# Patient Record
Sex: MALE | Race: WHITE | Employment: OTHER | URBAN - METROPOLITAN AREA
[De-identification: names, ages, dates, MRNs, and addresses within clinical notes are randomized per-mention and may not be internally consistent; named-entity substitution may affect disease eponyms.]

---

## 2023-12-05 RX ORDER — ATORVASTATIN CALCIUM 80 MG/1
80 TABLET, FILM COATED ORAL NIGHTLY
COMMUNITY
Start: 2017-09-25

## 2023-12-05 RX ORDER — ROPINIROLE 0.5 MG/1
0.5 TABLET, FILM COATED ORAL
COMMUNITY
Start: 2019-06-12 | End: 2023-12-05 | Stop reason: ALTCHOICE

## 2023-12-05 RX ORDER — GABAPENTIN 300 MG/1
300 CAPSULE ORAL DAILY
COMMUNITY
Start: 2019-11-20

## 2023-12-05 RX ORDER — ALPRAZOLAM 0.5 MG/1
TABLET ORAL
COMMUNITY
Start: 2019-11-12 | End: 2023-12-05 | Stop reason: ALTCHOICE

## 2023-12-05 RX ORDER — TAMSULOSIN HYDROCHLORIDE 0.4 MG/1
0.4 CAPSULE ORAL DAILY
COMMUNITY
Start: 2020-01-12 | End: 2023-12-05 | Stop reason: ALTCHOICE

## 2023-12-05 RX ORDER — AMLODIPINE BESYLATE 5 MG/1
5 TABLET ORAL DAILY
COMMUNITY

## 2023-12-05 RX ORDER — ASPIRIN 81 MG/1
81 TABLET ORAL DAILY
COMMUNITY
Start: 2018-10-15

## 2023-12-05 RX ORDER — NITROGLYCERIN 0.4 MG/1
0.4 TABLET SUBLINGUAL EVERY 5 MIN PRN
COMMUNITY
Start: 2019-11-20

## 2023-12-05 RX ORDER — GABAPENTIN 300 MG/1
900 CAPSULE ORAL NIGHTLY
COMMUNITY

## 2023-12-05 RX ORDER — CELECOXIB 100 MG/1
100 CAPSULE ORAL DAILY
COMMUNITY
End: 2023-12-05 | Stop reason: ALTCHOICE

## 2023-12-05 NOTE — OR NURSING
Dr. Marline Tapia office notified patient did stop eliquis but has not stopped baby aspirin for surgery.

## 2023-12-05 NOTE — DISCHARGE INSTRUCTIONS
Ureteroscopy with stent placement   Discharge Instructions: Take prescriptions as directed, ensuring you take entire course of antibiotic. No driving while taking narcotic pain medication. Wean off narcotics as soon as able. OK to shower after discharge. May resume regular diet. No heavy lifting >10lbs day of procedure, avoid strenuous activity, may walk. You may see blood in the urine after the procedure and entire time stent is in place. Please stay hydrated. You may experience flank pain, and/or frequency/urgency of urination while the stent is in place. Please use Flomax (Tamsulosin) to help with these symptoms. Please call attending physician or hospital  with questions. Please call or present to ED for fever >101 F, intractable nausea and vomiting, or uncontrolled pain. Follow up with Dr. Genet Rangel in 2 weeks. Call office to confirm appointment.

## 2023-12-05 NOTE — H&P
Pre-op History and Physical      Patient:  Tamika Jaime  MRN: [id number]  YOB: 1950    HISTORY OF PRESENT ILLNESS:     The patient is a 68 y.o. male who presents with right upper tract abnormality. Here for cystoscopy, bilateral retrograde pyelogram, possible right ureteral biopsy, possible right stent placement. Patient's old records, notes and chart reviewed and summarized above. Past Medical History:    Past Medical History:   Diagnosis Date    Arthritis     back , can not stand up straight    At risk for falling     pt states legs \" unstable \" , \" just give out \"    CAD (coronary artery disease)     Chronic pain     back    Constipation     COPD (chronic obstructive pulmonary disease) ([address])     COVID-19 vaccine series completed     per wife, asked to bring card day of surgery to get documented    Diverticulosis 10/2023    on CT scan    Edentulous     GERD (gastroesophageal reflux disease)     diet controlled    Hematuria 2023    intermittently    Hyperlipidemia     Hypertension     Kidney stones 10/2023    on CT scan    Mobility impaired     uses cane or walker    New onset a-fib ([address]) 10/20/2023    NSTEMI (non-ST elevated myocardial infarction) ([address]) 07/25/2017    Pneumonia 10/20/2023    admitted to [facility] til 10/23/2023 : able to see note s and testing [facility] [place]    Poor historian     Pulmonary nodule     ?  left middle lobe, supposed to have repeat CT scan in 1 month    Right renal mass 10/2023    SOB (shortness of breath)     on exertion    Swelling 10/2023    Bilat lower exts ;pt states now resolved    Tremor     left arm worse then right    Under care of team     Pulmonology at [facility] , last seen 11/13/2023    Under care of team     Neurologists, Dr. Kaye Kendrick in [place] , manages pain : pt states has been seen since hospitalization    Under care of team     Urology, Dr. Shannan Morales, last seen 10/30/2023    Under care of team     Cardiology at

## 2023-12-06 ENCOUNTER — ANESTHESIA EVENT (OUTPATIENT)
Dept: OPERATING ROOM | Age: 73
End: 2023-12-06
Payer: MEDICARE

## 2023-12-06 ENCOUNTER — HOSPITAL ENCOUNTER (OUTPATIENT)
Age: 73
Setting detail: OUTPATIENT SURGERY
Discharge: HOME | End: 2023-12-06
Attending: UROLOGY | Admitting: UROLOGY
Payer: MEDICARE

## 2023-12-06 ENCOUNTER — ANESTHESIA (OUTPATIENT)
Dept: OPERATING ROOM | Age: 73
End: 2023-12-06
Payer: MEDICARE

## 2023-12-06 ENCOUNTER — APPOINTMENT (OUTPATIENT)
Dept: GENERAL RADIOLOGY | Age: 73
End: 2023-12-06
Attending: UROLOGY
Payer: MEDICARE

## 2023-12-06 VITALS
DIASTOLIC BLOOD PRESSURE: 79 MMHG | TEMPERATURE: 97.3 F | WEIGHT: 190 LBS | HEART RATE: 64 BPM | RESPIRATION RATE: 18 BRPM | SYSTOLIC BLOOD PRESSURE: 140 MMHG | OXYGEN SATURATION: 98 % | HEIGHT: 72 IN | BODY MASS INDEX: 25.73 KG/M2

## 2023-12-06 DIAGNOSIS — R31.0 GROSS HEMATURIA: ICD-10-CM

## 2023-12-06 DIAGNOSIS — G89.18 POST-OP PAIN: Primary | ICD-10-CM

## 2023-12-06 LAB
BUN BLD-MCNC: 15 MG/DL (ref 8–26)
EGFR, POC: >60 ML/MIN/1.73M2
GLUCOSE BLD-MCNC: 84 MG/DL (ref 74–100)
POC CREATININE: 0.8 MG/DL (ref 0.51–1.19)

## 2023-12-06 PROCEDURE — 93005 ELECTROCARDIOGRAM TRACING: CPT | Performed by: ANESTHESIOLOGY

## 2023-12-06 PROCEDURE — 7100000011 HC PHASE II RECOVERY - ADDTL 15 MIN: Performed by: UROLOGY

## 2023-12-06 PROCEDURE — C1769 GUIDE WIRE: HCPCS | Performed by: UROLOGY

## 2023-12-06 PROCEDURE — 82565 ASSAY OF CREATININE: CPT

## 2023-12-06 PROCEDURE — 6360000002 HC RX W HCPCS: Performed by: NURSE ANESTHETIST, CERTIFIED REGISTERED

## 2023-12-06 PROCEDURE — 3700000001 HC ADD 15 MINUTES (ANESTHESIA): Performed by: UROLOGY

## 2023-12-06 PROCEDURE — 6360000002 HC RX W HCPCS: Performed by: PHYSICIAN ASSISTANT

## 2023-12-06 PROCEDURE — 2709999900 HC NON-CHARGEABLE SUPPLY: Performed by: UROLOGY

## 2023-12-06 PROCEDURE — 7100000000 HC PACU RECOVERY - FIRST 15 MIN: Performed by: UROLOGY

## 2023-12-06 PROCEDURE — 3700000000 HC ANESTHESIA ATTENDED CARE: Performed by: UROLOGY

## 2023-12-06 PROCEDURE — C2617 STENT, NON-COR, TEM W/O DEL: HCPCS | Performed by: UROLOGY

## 2023-12-06 PROCEDURE — C1758 CATHETER, URETERAL: HCPCS | Performed by: UROLOGY

## 2023-12-06 PROCEDURE — 84520 ASSAY OF UREA NITROGEN: CPT

## 2023-12-06 PROCEDURE — 2720000010 HC SURG SUPPLY STERILE: Performed by: UROLOGY

## 2023-12-06 PROCEDURE — 7100000001 HC PACU RECOVERY - ADDTL 15 MIN: Performed by: UROLOGY

## 2023-12-06 PROCEDURE — 88305 TISSUE EXAM BY PATHOLOGIST: CPT

## 2023-12-06 PROCEDURE — 2500000003 HC RX 250 WO HCPCS: Performed by: NURSE ANESTHETIST, CERTIFIED REGISTERED

## 2023-12-06 PROCEDURE — 82947 ASSAY GLUCOSE BLOOD QUANT: CPT

## 2023-12-06 PROCEDURE — 3600000003 HC SURGERY LEVEL 3 BASE: Performed by: UROLOGY

## 2023-12-06 PROCEDURE — 2580000003 HC RX 258: Performed by: ANESTHESIOLOGY

## 2023-12-06 PROCEDURE — 3600000013 HC SURGERY LEVEL 3 ADDTL 15MIN: Performed by: UROLOGY

## 2023-12-06 PROCEDURE — C1747 HC ENDOSCOPE, SINGLE, URINARY TRACT: HCPCS | Performed by: UROLOGY

## 2023-12-06 PROCEDURE — 2580000003 HC RX 258: Performed by: UROLOGY

## 2023-12-06 PROCEDURE — 7100000010 HC PHASE II RECOVERY - FIRST 15 MIN: Performed by: UROLOGY

## 2023-12-06 PROCEDURE — 6360000004 HC RX CONTRAST MEDICATION: Performed by: UROLOGY

## 2023-12-06 DEVICE — URETERAL STENT
Type: IMPLANTABLE DEVICE | Site: URETER | Status: FUNCTIONAL
Brand: PERCUFLEX™ PLUS

## 2023-12-06 RX ORDER — FENTANYL CITRATE 50 UG/ML
INJECTION, SOLUTION INTRAMUSCULAR; INTRAVENOUS PRN
Status: DISCONTINUED | OUTPATIENT
Start: 2023-12-06 | End: 2023-12-06 | Stop reason: SDUPTHER

## 2023-12-06 RX ORDER — HYOSCYAMINE SULFATE 0.12 MG/1
1 TABLET SUBLINGUAL EVERY 8 HOURS PRN
Qty: 15 EACH | Refills: 0 | Status: SHIPPED | OUTPATIENT
Start: 2023-12-06 | End: 2023-12-11

## 2023-12-06 RX ORDER — DEXAMETHASONE SODIUM PHOSPHATE 10 MG/ML
INJECTION INTRAMUSCULAR; INTRAVENOUS PRN
Status: DISCONTINUED | OUTPATIENT
Start: 2023-12-06 | End: 2023-12-06 | Stop reason: SDUPTHER

## 2023-12-06 RX ORDER — HYDRALAZINE HYDROCHLORIDE 20 MG/ML
10 INJECTION INTRAMUSCULAR; INTRAVENOUS
Status: DISCONTINUED | OUTPATIENT
Start: 2023-12-06 | End: 2023-12-06 | Stop reason: HOSPADM

## 2023-12-06 RX ORDER — DIPHENHYDRAMINE HYDROCHLORIDE 50 MG/ML
12.5 INJECTION INTRAMUSCULAR; INTRAVENOUS
Status: DISCONTINUED | OUTPATIENT
Start: 2023-12-06 | End: 2023-12-06 | Stop reason: HOSPADM

## 2023-12-06 RX ORDER — PROPOFOL 10 MG/ML
INJECTION, EMULSION INTRAVENOUS PRN
Status: DISCONTINUED | OUTPATIENT
Start: 2023-12-06 | End: 2023-12-06 | Stop reason: SDUPTHER

## 2023-12-06 RX ORDER — MEPERIDINE HYDROCHLORIDE 50 MG/ML
12.5 INJECTION INTRAMUSCULAR; INTRAVENOUS; SUBCUTANEOUS EVERY 5 MIN PRN
Status: DISCONTINUED | OUTPATIENT
Start: 2023-12-06 | End: 2023-12-06 | Stop reason: HOSPADM

## 2023-12-06 RX ORDER — ONDANSETRON 2 MG/ML
INJECTION INTRAMUSCULAR; INTRAVENOUS PRN
Status: DISCONTINUED | OUTPATIENT
Start: 2023-12-06 | End: 2023-12-06 | Stop reason: SDUPTHER

## 2023-12-06 RX ORDER — SODIUM CHLORIDE, SODIUM LACTATE, POTASSIUM CHLORIDE, CALCIUM CHLORIDE 600; 310; 30; 20 MG/100ML; MG/100ML; MG/100ML; MG/100ML
INJECTION, SOLUTION INTRAVENOUS CONTINUOUS
Status: DISCONTINUED | OUTPATIENT
Start: 2023-12-06 | End: 2023-12-06 | Stop reason: HOSPADM

## 2023-12-06 RX ORDER — TRAMADOL HYDROCHLORIDE 50 MG/1
50 TABLET ORAL EVERY 6 HOURS PRN
Qty: 12 TABLET | Refills: 0 | Status: SHIPPED | OUTPATIENT
Start: 2023-12-06 | End: 2023-12-09

## 2023-12-06 RX ORDER — TAMSULOSIN HYDROCHLORIDE 0.4 MG/1
0.4 CAPSULE ORAL DAILY
Qty: 15 CAPSULE | Refills: 0 | Status: SHIPPED | OUTPATIENT
Start: 2023-12-06 | End: 2023-12-21

## 2023-12-06 RX ORDER — CEFADROXIL 500 MG/1
500 CAPSULE ORAL 2 TIMES DAILY
Qty: 6 CAPSULE | Refills: 0 | Status: SHIPPED | OUTPATIENT
Start: 2023-12-06 | End: 2023-12-09

## 2023-12-06 RX ORDER — DROPERIDOL 2.5 MG/ML
0.62 INJECTION, SOLUTION INTRAMUSCULAR; INTRAVENOUS
Status: DISCONTINUED | OUTPATIENT
Start: 2023-12-06 | End: 2023-12-06 | Stop reason: HOSPADM

## 2023-12-06 RX ORDER — LIDOCAINE HYDROCHLORIDE 10 MG/ML
INJECTION, SOLUTION EPIDURAL; INFILTRATION; INTRACAUDAL; PERINEURAL PRN
Status: DISCONTINUED | OUTPATIENT
Start: 2023-12-06 | End: 2023-12-06 | Stop reason: SDUPTHER

## 2023-12-06 RX ORDER — PHENAZOPYRIDINE HYDROCHLORIDE 100 MG/1
100 TABLET, FILM COATED ORAL 3 TIMES DAILY PRN
Qty: 15 TABLET | Refills: 0 | Status: SHIPPED | OUTPATIENT
Start: 2023-12-06 | End: 2023-12-11

## 2023-12-06 RX ORDER — SODIUM CHLORIDE 9 MG/ML
INJECTION, SOLUTION INTRAVENOUS PRN
Status: DISCONTINUED | OUTPATIENT
Start: 2023-12-06 | End: 2023-12-06 | Stop reason: HOSPADM

## 2023-12-06 RX ORDER — SODIUM CHLORIDE 0.9 % (FLUSH) 0.9 %
5-40 SYRINGE (ML) INJECTION EVERY 12 HOURS SCHEDULED
Status: DISCONTINUED | OUTPATIENT
Start: 2023-12-06 | End: 2023-12-06 | Stop reason: HOSPADM

## 2023-12-06 RX ORDER — MAGNESIUM HYDROXIDE 1200 MG/15ML
LIQUID ORAL PRN
Status: DISCONTINUED | OUTPATIENT
Start: 2023-12-06 | End: 2023-12-06 | Stop reason: HOSPADM

## 2023-12-06 RX ORDER — METOCLOPRAMIDE HYDROCHLORIDE 5 MG/ML
10 INJECTION INTRAMUSCULAR; INTRAVENOUS
Status: DISCONTINUED | OUTPATIENT
Start: 2023-12-06 | End: 2023-12-06 | Stop reason: HOSPADM

## 2023-12-06 RX ORDER — SODIUM CHLORIDE 0.9 % (FLUSH) 0.9 %
5-40 SYRINGE (ML) INJECTION PRN
Status: DISCONTINUED | OUTPATIENT
Start: 2023-12-06 | End: 2023-12-06 | Stop reason: HOSPADM

## 2023-12-06 RX ADMIN — SODIUM CHLORIDE, POTASSIUM CHLORIDE, SODIUM LACTATE AND CALCIUM CHLORIDE: 600; 310; 30; 20 INJECTION, SOLUTION INTRAVENOUS at 09:46

## 2023-12-06 RX ADMIN — ONDANSETRON 4 MG: 2 INJECTION INTRAMUSCULAR; INTRAVENOUS at 12:20

## 2023-12-06 RX ADMIN — PROPOFOL 200 MG: 10 INJECTION, EMULSION INTRAVENOUS at 12:06

## 2023-12-06 RX ADMIN — FENTANYL CITRATE 25 MCG: 50 INJECTION, SOLUTION INTRAMUSCULAR; INTRAVENOUS at 12:51

## 2023-12-06 RX ADMIN — DEXAMETHASONE SODIUM PHOSPHATE 5 MG: 10 INJECTION INTRAMUSCULAR; INTRAVENOUS at 12:15

## 2023-12-06 RX ADMIN — FENTANYL CITRATE 25 MCG: 50 INJECTION, SOLUTION INTRAMUSCULAR; INTRAVENOUS at 12:40

## 2023-12-06 RX ADMIN — FENTANYL CITRATE 25 MCG: 50 INJECTION, SOLUTION INTRAMUSCULAR; INTRAVENOUS at 12:20

## 2023-12-06 RX ADMIN — LIDOCAINE HYDROCHLORIDE 50 MG: 10 INJECTION, SOLUTION EPIDURAL; INFILTRATION; INTRACAUDAL; PERINEURAL at 12:06

## 2023-12-06 RX ADMIN — Medication 2000 MG: at 12:19

## 2023-12-06 RX ADMIN — FENTANYL CITRATE 25 MCG: 50 INJECTION, SOLUTION INTRAMUSCULAR; INTRAVENOUS at 12:38

## 2023-12-06 ASSESSMENT — ENCOUNTER SYMPTOMS: SHORTNESS OF BREATH: 1

## 2023-12-06 ASSESSMENT — PAIN SCALES - WONG BAKER: WONGBAKER_NUMERICALRESPONSE: 0

## 2023-12-06 ASSESSMENT — PAIN - FUNCTIONAL ASSESSMENT: PAIN_FUNCTIONAL_ASSESSMENT: 0-10

## 2023-12-06 NOTE — OP NOTE
Operative Note      Patient: Radha Ryan  YOB: 1950  MRN: [id number]    Date of Procedure: 12/6/2023    Pre-Op Diagnosis Codes:     * Gross hematuria [R31.0]    Post-Op Diagnosis:  Renal pelvis mass       Procedure(s):  CYSTOSCOPY, BILATERAL RETROGRADE PYELOGRAMS, BILATERAL URETEROSCOPY, RIGHT URETERAL BIOPSY, BILATERAL STENT PLACEMENT    Surgeon(s):  Darrel Abrams MD    Assistant:   Resident: Orville Zhao MD; Nusrat Mittal MD; Samy Jung MD    Anesthesia: General    Estimated Blood Loss (mL): less than 967     Complications: None    Specimens:   ID Type Source Tests Collected by Time Destination   A : RIGHT URETER BIOPSY Tissue Ureter SURGICAL PATHOLOGY Darrel Abrams MD 12/6/2023 1234        Implants:  Implant Name Type Inv. Item Serial No.  Lot No. LRB No. Used Action   STENT URET 4.8FR L28CM PERCFLX + HYDR+ FIRM DUROMETER DB - [id number]  STENT URET 4.8FR L28CM PERCFLX + HYDR+ FIRM DUROMETER DBL  [employer]- [id number] Right 1 Implanted   STENT URET 4.8FR L28CM PERCFLX + HYDR+ FIRM DUROMETER DBL - [id number]  STENT URET 4.8FR L28CM PERCFLX + HYDR+ FIRM DUROMETER DBL  [employer]- [id number] Left 1 Implanted         Drains:   Urinary Catheter 12/06/23 2 Way (Active)   Catheter Indications Perioperative use for selected surgical procedures 12/06/23 1315   Site Assessment Pink; No urethral drainage 12/06/23 1315   Urine Color Cherry 12/06/23 1315   Urine Appearance Clear 12/06/23 1315   Collection Container Standard 12/06/23 1315   Securement Method Leg strap 12/06/23 1315   Catheter Best Practices  Catheter secured to thigh; Bag below bladder;Bag not on floor;Drainage bag less than half full 12/06/23 1315   Status Draining 12/06/23 1315   Output (mL) 100 mL 12/06/23 1315       Findings: On ureteroscopy, renal pelvis on the right-hand side filled with necrotic mass.   Large filling defect of almost the entire renal pelvis on the right-hand side retrograde side however the ureter was generally very narrow in caliber and we were unable to get up to the renal pelvis even after switching one of the Glidewire's up to a stiff wire. At this time we withdrew the ureteroscope and placed another 4.8 x 28 double-J ureteral stent with good distal and proximal curl. This concluded the procedure. The patient had a bit of hematuria so a Byrd catheter was placed. This cleared up in the PACU and was removed. Attending physician was present throughout the procedure. Plan:  Further/formal plan dependent on final pathology. However it is likely that the patient has high-grade papillary urothelial carcinoma at least on the right-hand side. Will need bilateral stent removals in the future as well as perhaps traversing up the left-hand side via ureteroscopy after passive dilation is complete.     Electronically signed by Bruce Villagran MD on 12/6/2023 at 3:02 PM

## 2023-12-06 NOTE — ANESTHESIA PRE PROCEDURE
12/06/23  0943   POCGLU 84   POCBUN 15       Coags: No results found for: \"PROTIME\", \"INR\", \"APTT\"    HCG (If Applicable): No results found for: \"PREGTESTUR\", \"PREGSERUM\", \"HCG\", \"HCGQUANT\"     ABGs: No results found for: \"PHART\", \"PO2ART\", \"YIL9LDV\", \"NUK7FBW\", \"BEART\", \"Z1GTDKLL\"     Type & Screen (If Applicable):  No results found for: \"LABABO\", \"LABRH\"    Drug/Infectious Status (If Applicable):  No results found for: \"HIV\", \"HEPCAB\"    COVID-19 Screening (If Applicable): No results found for: \"COVID19\"           EKG 12/6/2023  Atrial fibrillation with slow ventricular response  Abnormal ECG  No previous ECGs available              Anesthesia Evaluation    Airway: Mallampati: III  TM distance: >3 FB   Neck ROM: limited  Mouth opening: > = 3 FB   Dental:    (+) other and edentulous      Pulmonary:   (+) pneumonia:  COPD:  shortness of breath:     decreased breath sounds                               Cardiovascular:    (+) hypertension:, past MI:, CAD:, CABG/stent:, dysrhythmias: atrial fibrillation      ECG reviewed                        Neuro/Psych:               GI/Hepatic/Renal:             Endo/Other:                     Abdominal: normal exam            Vascular: Other Findings:             Anesthesia Plan      general     ASA 3     (LMA ok)  Induction: intravenous. MIPS: Postoperative opioids intended. Anesthetic plan and risks discussed with patient and child/children. Plan discussed with CRNA.                     Pau Coats MD   12/6/2023

## 2023-12-07 LAB
EKG ATRIAL RATE: 76 BPM
EKG Q-T INTERVAL: 428 MS
EKG QRS DURATION: 90 MS
EKG QTC CALCULATION (BAZETT): 353 MS
EKG R AXIS: 84 DEGREES
EKG T AXIS: 25 DEGREES
EKG VENTRICULAR RATE: 41 BPM

## 2023-12-07 PROCEDURE — 93010 ELECTROCARDIOGRAM REPORT: CPT | Performed by: INTERNAL MEDICINE

## 2023-12-08 LAB — SURGICAL PATHOLOGY REPORT: NORMAL

## 2023-12-12 ENCOUNTER — ANESTHESIA EVENT (OUTPATIENT)
Dept: OPERATING ROOM | Age: 73
End: 2023-12-12
Payer: MEDICARE

## 2023-12-12 ENCOUNTER — ANESTHESIA (OUTPATIENT)
Dept: OPERATING ROOM | Age: 73
End: 2023-12-12
Payer: MEDICARE

## 2023-12-12 ENCOUNTER — APPOINTMENT (OUTPATIENT)
Dept: GENERAL RADIOLOGY | Age: 73
End: 2023-12-12
Attending: UROLOGY
Payer: MEDICARE

## 2023-12-12 ENCOUNTER — HOSPITAL ENCOUNTER (OUTPATIENT)
Age: 73
Setting detail: OUTPATIENT SURGERY
Discharge: HOME | End: 2023-12-12
Attending: UROLOGY | Admitting: UROLOGY
Payer: MEDICARE

## 2023-12-12 VITALS
OXYGEN SATURATION: 96 % | TEMPERATURE: 96.8 F | DIASTOLIC BLOOD PRESSURE: 75 MMHG | WEIGHT: 190 LBS | HEART RATE: 61 BPM | HEIGHT: 72 IN | RESPIRATION RATE: 17 BRPM | BODY MASS INDEX: 25.73 KG/M2 | SYSTOLIC BLOOD PRESSURE: 115 MMHG

## 2023-12-12 DIAGNOSIS — G89.18 ACUTE POST-OPERATIVE PAIN: Primary | ICD-10-CM

## 2023-12-12 PROCEDURE — 3600000012 HC SURGERY LEVEL 2 ADDTL 15MIN: Performed by: UROLOGY

## 2023-12-12 PROCEDURE — 2580000003 HC RX 258: Performed by: UROLOGY

## 2023-12-12 PROCEDURE — C1758 CATHETER, URETERAL: HCPCS | Performed by: UROLOGY

## 2023-12-12 PROCEDURE — 2709999900 HC NON-CHARGEABLE SUPPLY: Performed by: UROLOGY

## 2023-12-12 PROCEDURE — C1747 HC ENDOSCOPE, SINGLE, URINARY TRACT: HCPCS | Performed by: UROLOGY

## 2023-12-12 PROCEDURE — 7100000010 HC PHASE II RECOVERY - FIRST 15 MIN: Performed by: UROLOGY

## 2023-12-12 PROCEDURE — 2500000003 HC RX 250 WO HCPCS: Performed by: NURSE ANESTHETIST, CERTIFIED REGISTERED

## 2023-12-12 PROCEDURE — 6360000002 HC RX W HCPCS: Performed by: NURSE ANESTHETIST, CERTIFIED REGISTERED

## 2023-12-12 PROCEDURE — 3700000001 HC ADD 15 MINUTES (ANESTHESIA): Performed by: UROLOGY

## 2023-12-12 PROCEDURE — 2580000003 HC RX 258: Performed by: NURSE ANESTHETIST, CERTIFIED REGISTERED

## 2023-12-12 PROCEDURE — 3600000002 HC SURGERY LEVEL 2 BASE: Performed by: UROLOGY

## 2023-12-12 PROCEDURE — C1769 GUIDE WIRE: HCPCS | Performed by: UROLOGY

## 2023-12-12 PROCEDURE — 3700000000 HC ANESTHESIA ATTENDED CARE: Performed by: UROLOGY

## 2023-12-12 PROCEDURE — 6360000002 HC RX W HCPCS: Performed by: PHYSICIAN ASSISTANT

## 2023-12-12 PROCEDURE — 7100000000 HC PACU RECOVERY - FIRST 15 MIN: Performed by: UROLOGY

## 2023-12-12 PROCEDURE — 7100000001 HC PACU RECOVERY - ADDTL 15 MIN: Performed by: UROLOGY

## 2023-12-12 PROCEDURE — C2617 STENT, NON-COR, TEM W/O DEL: HCPCS | Performed by: UROLOGY

## 2023-12-12 DEVICE — URETERAL STENT
Type: IMPLANTABLE DEVICE | Status: FUNCTIONAL
Brand: PERCUFLEX™ PLUS

## 2023-12-12 RX ORDER — DEXAMETHASONE SODIUM PHOSPHATE 10 MG/ML
INJECTION INTRAMUSCULAR; INTRAVENOUS PRN
Status: DISCONTINUED | OUTPATIENT
Start: 2023-12-12 | End: 2023-12-12 | Stop reason: SDUPTHER

## 2023-12-12 RX ORDER — FENTANYL CITRATE 50 UG/ML
50 INJECTION, SOLUTION INTRAMUSCULAR; INTRAVENOUS EVERY 5 MIN PRN
Status: DISCONTINUED | OUTPATIENT
Start: 2023-12-12 | End: 2023-12-12 | Stop reason: HOSPADM

## 2023-12-12 RX ORDER — SODIUM CHLORIDE, SODIUM LACTATE, POTASSIUM CHLORIDE, CALCIUM CHLORIDE 600; 310; 30; 20 MG/100ML; MG/100ML; MG/100ML; MG/100ML
INJECTION, SOLUTION INTRAVENOUS CONTINUOUS PRN
Status: DISCONTINUED | OUTPATIENT
Start: 2023-12-12 | End: 2023-12-12 | Stop reason: SDUPTHER

## 2023-12-12 RX ORDER — HYOSCYAMINE SULFATE 0.12 MG/1
1 TABLET SUBLINGUAL EVERY 8 HOURS PRN
Qty: 15 EACH | Refills: 0 | Status: SHIPPED | OUTPATIENT
Start: 2023-12-12 | End: 2023-12-17

## 2023-12-12 RX ORDER — ONDANSETRON 2 MG/ML
INJECTION INTRAMUSCULAR; INTRAVENOUS PRN
Status: DISCONTINUED | OUTPATIENT
Start: 2023-12-12 | End: 2023-12-12 | Stop reason: SDUPTHER

## 2023-12-12 RX ORDER — TAMSULOSIN HYDROCHLORIDE 0.4 MG/1
0.4 CAPSULE ORAL DAILY
Qty: 90 CAPSULE | Refills: 1 | Status: SHIPPED | OUTPATIENT
Start: 2023-12-12

## 2023-12-12 RX ORDER — LIDOCAINE HYDROCHLORIDE 10 MG/ML
INJECTION, SOLUTION EPIDURAL; INFILTRATION; INTRACAUDAL; PERINEURAL PRN
Status: DISCONTINUED | OUTPATIENT
Start: 2023-12-12 | End: 2023-12-12 | Stop reason: SDUPTHER

## 2023-12-12 RX ORDER — SODIUM CHLORIDE 0.9 % (FLUSH) 0.9 %
5-40 SYRINGE (ML) INJECTION PRN
Status: DISCONTINUED | OUTPATIENT
Start: 2023-12-12 | End: 2023-12-12 | Stop reason: HOSPADM

## 2023-12-12 RX ORDER — FENTANYL CITRATE 50 UG/ML
INJECTION, SOLUTION INTRAMUSCULAR; INTRAVENOUS PRN
Status: DISCONTINUED | OUTPATIENT
Start: 2023-12-12 | End: 2023-12-12 | Stop reason: SDUPTHER

## 2023-12-12 RX ORDER — PROPOFOL 10 MG/ML
INJECTION, EMULSION INTRAVENOUS PRN
Status: DISCONTINUED | OUTPATIENT
Start: 2023-12-12 | End: 2023-12-12 | Stop reason: SDUPTHER

## 2023-12-12 RX ORDER — SODIUM CHLORIDE 0.9 % (FLUSH) 0.9 %
5-40 SYRINGE (ML) INJECTION EVERY 12 HOURS SCHEDULED
Status: DISCONTINUED | OUTPATIENT
Start: 2023-12-12 | End: 2023-12-12 | Stop reason: HOSPADM

## 2023-12-12 RX ORDER — OXYCODONE HYDROCHLORIDE 5 MG/1
5 TABLET ORAL EVERY 6 HOURS PRN
Qty: 12 TABLET | Refills: 0 | Status: SHIPPED | OUTPATIENT
Start: 2023-12-12 | End: 2023-12-15

## 2023-12-12 RX ORDER — FENTANYL CITRATE 50 UG/ML
25 INJECTION, SOLUTION INTRAMUSCULAR; INTRAVENOUS EVERY 5 MIN PRN
Status: DISCONTINUED | OUTPATIENT
Start: 2023-12-12 | End: 2023-12-12 | Stop reason: HOSPADM

## 2023-12-12 RX ORDER — MAGNESIUM HYDROXIDE 1200 MG/15ML
LIQUID ORAL PRN
Status: DISCONTINUED | OUTPATIENT
Start: 2023-12-12 | End: 2023-12-12 | Stop reason: HOSPADM

## 2023-12-12 RX ORDER — SODIUM CHLORIDE 9 MG/ML
INJECTION, SOLUTION INTRAVENOUS PRN
Status: DISCONTINUED | OUTPATIENT
Start: 2023-12-12 | End: 2023-12-12 | Stop reason: HOSPADM

## 2023-12-12 RX ADMIN — PHENYLEPHRINE HYDROCHLORIDE 100 MCG: 10 INJECTION INTRAVENOUS at 11:11

## 2023-12-12 RX ADMIN — FENTANYL CITRATE 25 MCG: 50 INJECTION, SOLUTION INTRAMUSCULAR; INTRAVENOUS at 11:37

## 2023-12-12 RX ADMIN — PHENYLEPHRINE HYDROCHLORIDE 100 MCG: 10 INJECTION INTRAVENOUS at 11:22

## 2023-12-12 RX ADMIN — ONDANSETRON 4 MG: 2 INJECTION INTRAMUSCULAR; INTRAVENOUS at 11:39

## 2023-12-12 RX ADMIN — FENTANYL CITRATE 25 MCG: 50 INJECTION, SOLUTION INTRAMUSCULAR; INTRAVENOUS at 11:38

## 2023-12-12 RX ADMIN — Medication 2000 MG: at 11:24

## 2023-12-12 RX ADMIN — SODIUM CHLORIDE, POTASSIUM CHLORIDE, SODIUM LACTATE AND CALCIUM CHLORIDE: 600; 310; 30; 20 INJECTION, SOLUTION INTRAVENOUS at 11:00

## 2023-12-12 RX ADMIN — PROPOFOL 140 MG: 10 INJECTION, EMULSION INTRAVENOUS at 11:09

## 2023-12-12 RX ADMIN — DEXAMETHASONE SODIUM PHOSPHATE 5 MG: 10 INJECTION INTRAMUSCULAR; INTRAVENOUS at 11:30

## 2023-12-12 RX ADMIN — LIDOCAINE HYDROCHLORIDE 50 MG: 10 INJECTION, SOLUTION EPIDURAL; INFILTRATION; INTRACAUDAL; PERINEURAL at 11:09

## 2023-12-12 RX ADMIN — FENTANYL CITRATE 50 MCG: 50 INJECTION, SOLUTION INTRAMUSCULAR; INTRAVENOUS at 11:09

## 2023-12-12 ASSESSMENT — PAIN - FUNCTIONAL ASSESSMENT
PAIN_FUNCTIONAL_ASSESSMENT: 0-10
PAIN_FUNCTIONAL_ASSESSMENT: 0-10

## 2023-12-12 ASSESSMENT — PAIN SCALES - GENERAL: PAINLEVEL_OUTOF10: 0

## 2023-12-12 ASSESSMENT — ENCOUNTER SYMPTOMS: SHORTNESS OF BREATH: 1

## 2023-12-12 ASSESSMENT — PAIN DESCRIPTION - DESCRIPTORS
DESCRIPTORS: NAGGING;DISCOMFORT
DESCRIPTORS: DISCOMFORT

## 2023-12-12 NOTE — OP NOTE
Operative Note      Patient: Ino Morgan  YOB: 1950  MRN: [id number]    Date of Procedure: 12/12/2023    Pre-Op Diagnosis Codes:     * Right renal mass [N28.89]    Post-Op Diagnosis: Same       Procedure(s):  CYSTOSCOPY   RIGHT STENT REMOVAL  LEFT URETEROSCOPY  LEFT STENT EXCHANGE     Surgeon(s):  Suzanne Puckett MD    Assistant:   * No surgical staff found *    Anesthesia: General    Estimated Blood Loss (mL): Minimal    Complications: None    Specimens:   * No specimens in log *    Implants:  Implant Name Type Inv. Item Serial No.  Lot No. LRB No. Used Action   STENT URET 4.8FR L28CM PERCFLX + HYDR+ FIRM DUROMETER DBL - [id number]  STENT URET 4.8FR L28CM PERCFLX + HYDR+ FIRM DUROMETER DBL  [employer]- [id number]  1 Implanted         Drains:   [REMOVED] Urinary Catheter 12/06/23 2 Way (Removed)   Catheter Indications Perioperative use for selected surgical procedures 12/06/23 1315   Site Assessment Pink; No urethral drainage 12/06/23 1315   Urine Color Pink; Cherry 12/06/23 1400   Urine Appearance Clear 12/06/23 1400   Collection Container Standard 12/06/23 1315   Securement Method Leg strap 12/06/23 1315   Catheter Best Practices  Catheter secured to thigh; Bag below bladder;Bag not on floor;Drainage bag less than half full 12/06/23 1315   Status Draining 12/06/23 1315   Output (mL) 400 mL 12/06/23 1400       Findings:   No signs of urothelial carcinoma on left ureteroscopy     INDICATIONS FOR PROCEDURE:  Ino Morgan is a 68 y.o. male with a history of biopsy confirmed high-grade urothelial carcinoma on his right side. Patient is here to confirm no tumor or tumor on his left side since last ureteroscopy they were unable to get up his left side. After the risks, benefits, alternatives, of the procedure were discussed with the patient, informed consent was obtained. The patient elected to proceed.      DETAILS OF THE PROCEDURE:  The patient was brought back from the preoperative

## 2023-12-12 NOTE — DISCHARGE INSTRUCTIONS
Cystoscopy stent placement with string: You may see blood in the urine after the procedure. This should resolve over the next couple days. Please stay hydrated. You may see intermittent blood in the urine while the stent is in place. This is expected. You may experience flank pain, and/or frequency/urgency of urination while the stent is in place. Take medications as prescribed to help with these symptoms. Patient ok to discharge home in good condition    No heavy lifting, >10 lbs for today  Pt should avoid strenuous activity for today  Pt should walk moderately at home  Pt ok to shower   Pt may resume diet as tolerated  Pt should take prescriptions as directed  No driving while on narcotics  Please call attending physician or hospital  with questions  Call or Present to ED if fever (> 101F), intractable nausea vomiting or pain. Pt should follow up with Dr. Cassie Farley, in 4 weeks for next upcoming surgery, please call to confirm appointment    Patient should pull stent in the morning of 12/17/23. There may be some pain associated with the stent removal, which is usually self limiting. We suggest using the pain medication prescribed for you and a nonsteroidal anti-inflammatory such as Ibuprofen, if you are able to take this medication, to control symptoms. Please stay hydrated. Please call with questions.

## 2023-12-12 NOTE — H&P
tablet Take 1 tablet by mouth daily [facility] 10/15/18   Luis Marmolejo MD   atorvastatin (LIPITOR) 80 MG tablet Take 1 tablet by mouth nightly 9/25/17   Luis Marmolejo MD   gabapentin (NEURONTIN) 300 MG capsule Take 1 capsule by mouth daily. am 11/20/19   Luis Marmolejo MD   metoprolol tartrate (LOPRESSOR) 25 MG tablet Take 0.5 tablets by mouth 2 times daily 9/25/17   Luis Marmolejo MD   Multiple Vitamin (MULTI-VITAMIN) TABS Take 1 tablet by mouth daily    Luis Marmolejo MD   nitroGLYCERIN (NITROSTAT) 0.4 MG SL tablet Place 1 tablet under the tongue every 5 minutes as needed for Chest pain 11/20/19   Luis Marmolejo MD   OXYCODONE-ACETAMINOPHEN PO Take 1 tablet by mouth 3 times daily as needed (back pain) 5/325    Luis Marmolejo MD   gabapentin (NEURONTIN) 300 MG capsule Take 3 capsules by mouth at bedtime. Luis Marmolejo MD       Allergies:  Patient has no known allergies.     Social History:    Social History     Socioeconomic History    Marital status:      Spouse name: Not on file    Number of children: Not on file    Years of education: Not on file    Highest education level: Not on file   Occupational History    Not on file   Tobacco Use    Smoking status: Every Day     Packs/day: 2.00     Years: 60.00     Additional pack years: 0.00     Total pack years: 120.00     Types: Cigarettes     Start date: 7/17/1963    Smokeless tobacco: Never   Vaping Use    Vaping Use: Never used   Substance and Sexual Activity    Alcohol use: Not Currently     Comment: none since CAD started in 2017, never had abuse issue    Drug use: Never    Sexual activity: Not on file   Other Topics Concern    Not on file   Social History Narrative    Not on file     Social Determinants of Health     Financial Resource Strain: Not on file   Food Insecurity: Not on file   Transportation Needs: Not on file   Physical Activity: Not on file   Stress: Not on file

## 2023-12-12 NOTE — ANESTHESIA POSTPROCEDURE EVALUATION
Department of Anesthesiology  Postprocedure Note    Patient: Tommy Stratton  MRN: [id number]  YOB: 1950  Date of evaluation: 12/12/2023      Procedure Summary       Date: 12/12/23 Room / Location: [address]    Anesthesia Start: 1104 Anesthesia Stop: 1158    Procedures:       CYSTOSCOPY STENT REMOVAL (Bilateral)      URETEROSCOPY (Left) Diagnosis:       Right renal mass      (Right renal mass [N28.89])    Surgeons: Enolia Buerger, MD Responsible Provider: Galen Rosales MD    Anesthesia Type: General ASA Status: 3            Anesthesia Type: General    Akash Phase I: Akash Score: 10    Akash Phase II: Akash Score: 10      Anesthesia Post Evaluation    Patient location during evaluation: PACU  Patient participation: complete - patient participated  Level of consciousness: awake and alert  Pain score: 1  Airway patency: patent  Nausea & Vomiting: no nausea and no vomiting  Complications: no  Cardiovascular status: hemodynamically stable  Respiratory status: acceptable  Hydration status: euvolemic  Pain management: adequate

## 2024-01-23 RX ORDER — SODIUM CHLORIDE, SODIUM LACTATE, POTASSIUM CHLORIDE, CALCIUM CHLORIDE 600; 310; 30; 20 MG/100ML; MG/100ML; MG/100ML; MG/100ML
INJECTION, SOLUTION INTRAVENOUS CONTINUOUS
OUTPATIENT
Start: 2024-01-23

## 2024-01-23 NOTE — DISCHARGE INSTRUCTIONS
Pre-operative Instructions           NOTHING to eat or drink after midnight the night prior to surgery   (This includes gum, candy, mints, chewing tobacco, etc). Smoking cessation is always advised.   (Follow bowel prep instructions as/if instructed by your surgeon)    Please arrive at the surgery center (Entrance B) by 10:00 AM on 2/7/2024 (or as directed by your surgeon's office).  See Directons to Surgery Center on next page.     Please take only the following medication(s) the day of surgery with a small sip of water: amlodipine (Norvasc), metoprolol (Lopressor), gabapentin (Neurontin), if needed/available: oxycodone (Percocet)    If applicable:   -Use/bring daily inhalers with you   -Do not take diabetic medications on the day of surgery.    Please stop any blood thinning medications  AS DIRECTED BY PRESCRIBING PROVIDER! : Eliquis and Aspirin   Failure to stop these medications as instructed (too soon or too late) may result in injury to you, or your surgery may need to be rescheduled. Below is a list of some examples for your reference.     Antiplatelets : (stop blood cells (called platelets) from sticking together and forming a blood clot):   Aspirin (Bufferin, Ecotrin), Clopidogrel (Plavix), Ticagrelor (Brilinta), Prasugrel (Effient), Dipyridamole/aspirin (Aggrenox), Ticlopidine (Ticlid), Eptifibatide (Integrilin)    Anticoagulants: (slow down your body's process of making clots):   Warfarin (Coumadin), Rivaroxaban (Xarelto), Dabigatran (Pradaxa), Apixaban (Eliquis), Edoxaban (Savaysa), Heparin, Enoxaparin (Lovenox), Fondaparinux (Arixtra)    NSAIDS: Aspirin (Bufferin, Ecotrin), Ibuprofen (Motrin, Nuprin,Advil), Naproxen (Aleve),Meloxicam (Mobic), Celecoxib (Celebrex), Diclofenac (Voltaren), Etodolac (Lodine), Indomethacin, Ketorolac, Nabumetone, Oxaprozin (Daypro), Piroxicam (Feldene), Excedrin (has aspirin in it)    Herbals/supplements: Bromelain, Cinnamon, Cayenne Pepper, Dong quai (female ginseng), Fish

## 2024-01-29 ENCOUNTER — HOSPITAL ENCOUNTER (OUTPATIENT)
Dept: PREADMISSION TESTING | Age: 74
Discharge: HOME | End: 2024-02-02
Payer: MEDICARE

## 2024-01-29 VITALS
HEIGHT: 72 IN | RESPIRATION RATE: 18 BRPM | TEMPERATURE: 98.4 F | WEIGHT: 206 LBS | SYSTOLIC BLOOD PRESSURE: 136 MMHG | OXYGEN SATURATION: 98 % | BODY MASS INDEX: 27.9 KG/M2 | DIASTOLIC BLOOD PRESSURE: 82 MMHG | HEART RATE: 78 BPM

## 2024-01-29 LAB
ABO + RH BLD: NORMAL
ANION GAP SERPL CALCULATED.3IONS-SCNC: 10 MMOL/L (ref 9–17)
ARM BAND NUMBER: NORMAL
BLOOD BANK SAMPLE EXPIRATION: NORMAL
BLOOD GROUP ANTIBODIES SERPL: NEGATIVE
BUN SERPL-MCNC: 13 MG/DL (ref 8–23)
CHLORIDE SERPL-SCNC: 108 MMOL/L (ref 98–107)
CO2 SERPL-SCNC: 24 MMOL/L (ref 20–31)
CREAT SERPL-MCNC: 0.8 MG/DL (ref 0.7–1.2)
ERYTHROCYTE [DISTWIDTH] IN BLOOD BY AUTOMATED COUNT: 20.2 % (ref 11.8–14.4)
GFR SERPL CREATININE-BSD FRML MDRD: >60 ML/MIN/1.73M2
GLUCOSE SERPL-MCNC: 88 MG/DL (ref 70–99)
HCT VFR BLD AUTO: 36.8 % (ref 40.7–50.3)
HGB BLD-MCNC: 11.2 G/DL (ref 13–17)
MCH RBC QN AUTO: 23.4 PG (ref 25.2–33.5)
MCHC RBC AUTO-ENTMCNC: 30.4 G/DL (ref 28.4–34.8)
MCV RBC AUTO: 76.8 FL (ref 82.6–102.9)
NRBC BLD-RTO: 0 PER 100 WBC
PLATELET # BLD AUTO: 314 K/UL (ref 138–453)
PMV BLD AUTO: 10.7 FL (ref 8.1–13.5)
POTASSIUM SERPL-SCNC: 4 MMOL/L (ref 3.7–5.3)
RBC # BLD AUTO: 4.79 M/UL (ref 4.21–5.77)
SODIUM SERPL-SCNC: 142 MMOL/L (ref 135–144)
WBC OTHER # BLD: 10.9 K/UL (ref 3.5–11.3)

## 2024-01-29 PROCEDURE — 86850 RBC ANTIBODY SCREEN: CPT

## 2024-01-29 PROCEDURE — 36415 COLL VENOUS BLD VENIPUNCTURE: CPT

## 2024-01-29 PROCEDURE — 85027 COMPLETE CBC AUTOMATED: CPT

## 2024-01-29 PROCEDURE — 80051 ELECTROLYTE PANEL: CPT

## 2024-01-29 PROCEDURE — 82947 ASSAY GLUCOSE BLOOD QUANT: CPT

## 2024-01-29 PROCEDURE — 93005 ELECTROCARDIOGRAM TRACING: CPT | Performed by: ANESTHESIOLOGY

## 2024-01-29 PROCEDURE — 86900 BLOOD TYPING SEROLOGIC ABO: CPT

## 2024-01-29 PROCEDURE — 86901 BLOOD TYPING SEROLOGIC RH(D): CPT

## 2024-01-29 PROCEDURE — 87186 SC STD MICRODIL/AGAR DIL: CPT

## 2024-01-29 PROCEDURE — 87077 CULTURE AEROBIC IDENTIFY: CPT

## 2024-01-29 PROCEDURE — 82565 ASSAY OF CREATININE: CPT

## 2024-01-29 PROCEDURE — 87086 URINE CULTURE/COLONY COUNT: CPT

## 2024-01-29 PROCEDURE — 84520 ASSAY OF UREA NITROGEN: CPT

## 2024-01-29 NOTE — PROGRESS NOTES
Anesthesia Focused Assessment  Upcoming surgery:   2/7/2024 XI ROBOTIC LAPAROSCOPIC NEPHROURETERECTOMY - Right Pierre Iyer MD     Hx of anesthesia complications:  no  Family hx of anesthesia complications:  no    Covid in past 8 weeks? No, but somewhat limited historian and pt and wife state around thanksgiving 2023 he had covid-19.     They state no official COPD diagnosis, but he is a 2 ppd smoker and states he has an upcoming appointment with pulmonologist in February 2024.     METS functional capacity:   Moderate    STOP-BANG Sleep Apnea Questionnaire  SNORE loudly (heard through closed doors)?   No  TIRED, fatigued, sleepy during daytime?    No  OBSERVED stopping breathing during sleep?   No  High blood PRESSURE or being treated?    Yes    BMI over 35?        No  AGE over 50?        Yes  NECK circumference over 16\"?     No  GENDER (male)?       Yes             Total 3  High risk 5-8  Intermediate risk 3-4  Low risk 0-2    ----------------------------------------------------------------------------------------------------------------------  BEKA:                                             no  If yes, machine?:                              Type 1 DM:                                   no  T2DM:                                           no    Coronary Artery Disease:             yes, stents x 4  Hypertension:                               yes  Defib / AICD / Pacemaker:           no    Renal Failure:                               no  If yes, on dialysis?:                           Active smoker:                              yes 2ppd  Drinks Alcohol:                              no  Illicit drugs:                                    no    Dentition:                                      edentulous     Past Medical History:   Diagnosis Date    Arthritis     back , can not stand up straight    At risk for falling     pt states legs \" unstable \" , \" just give out \"    CAD (coronary artery disease)     4 stents

## 2024-01-29 NOTE — H&P (VIEW-ONLY)
History and Physical    Pt Name: Moris Mcneil  MRN: [id number]  YOB: 1950  Date of evaluation: 1/29/2024  Primary Care Physician: Digna Waddell MD    SUBJECTIVE:   History of Chief Complaint:    Moris Mcneil is a 73 y.o. male who presents for PAT appointment. Patient complains of renal mass that was an incidental finding after having MRI imaging of his spine. He reports a history of hematuria. Patient has been scheduled for XI ROBOTIC LAPAROSCOPIC NEPHROURETERECTOMY - Right   Allergies  has No Known Allergies.  Medications  Prior to Admission medications    Medication Sig Start Date End Date Taking? Authorizing Provider   tamsulosin (FLOMAX) 0.4 MG capsule Take 1 capsule by mouth daily 12/12/23   Aracely Lizama MD   Hyoscyamine Sulfate SL (LEVSIN/SL) 0.125 MG SUBL Place 1 tablet under the tongue every 8 hours as needed (bladder spasms or stent pain) 12/12/23 12/17/23  Aracely Lizama MD   amLODIPine (NORVASC) 5 MG tablet Take 1 tablet by mouth daily    Luis Marmolejo MD   apixaban (ELIQUIS) 5 MG TABS tablet Take 1 tablet by mouth 2 times daily Pt. stopped 12-9-23 for OR 12-12-23. Mgmt. cardiology    Luis Marmolejo MD   aspirin 81 MG EC tablet Take 1 tablet by mouth daily Mgmt. Dr. [facility] 10/15/18   Luis Marmolejo MD   atorvastatin (LIPITOR) 80 MG tablet Take 1 tablet by mouth nightly 9/25/17   Luis Marmolejo MD   gabapentin (NEURONTIN) 300 MG capsule Take 1 capsule by mouth daily. am 11/20/19   Luis Marmolejo MD   metoprolol tartrate (LOPRESSOR) 25 MG tablet Take 0.5 tablets by mouth 2 times daily 9/25/17   Luis Marmolejo MD   Multiple Vitamin (MULTI-VITAMIN) TABS Take 1 tablet by mouth daily    Luis Marmolejo MD   nitroGLYCERIN (NITROSTAT) 0.4 MG SL tablet Place 1 tablet under the tongue every 5 minutes as needed for Chest pain 11/20/19   Luis Marmolejo MD   OXYCODONE-ACETAMINOPHEN PO Take 1 tablet by mouth 3 times

## 2024-01-30 LAB
EKG ATRIAL RATE: 74 BPM
EKG P AXIS: 42 DEGREES
EKG P-R INTERVAL: 402 MS
EKG Q-T INTERVAL: 390 MS
EKG QRS DURATION: 94 MS
EKG QTC CALCULATION (BAZETT): 432 MS
EKG R AXIS: 71 DEGREES
EKG T AXIS: 31 DEGREES
EKG VENTRICULAR RATE: 74 BPM
MICROORGANISM SPEC CULT: ABNORMAL
SPECIMEN DESCRIPTION: ABNORMAL

## 2024-01-30 PROCEDURE — 93010 ELECTROCARDIOGRAM REPORT: CPT | Performed by: INTERNAL MEDICINE

## 2024-01-30 RX ORDER — HEPARIN SODIUM 5000 [USP'U]/ML
5000 INJECTION, SOLUTION INTRAVENOUS; SUBCUTANEOUS ONCE
OUTPATIENT
Start: 2024-01-30 | End: 2024-01-30

## 2024-02-06 ENCOUNTER — ANESTHESIA EVENT (OUTPATIENT)
Dept: OPERATING ROOM | Age: 74
DRG: 658 | End: 2024-02-06
Payer: MEDICARE

## 2024-02-06 ASSESSMENT — ENCOUNTER SYMPTOMS: SHORTNESS OF BREATH: 1

## 2024-02-06 ASSESSMENT — LIFESTYLE VARIABLES: SMOKING_STATUS: 1

## 2024-02-07 ENCOUNTER — ANESTHESIA (OUTPATIENT)
Dept: OPERATING ROOM | Age: 74
DRG: 658 | End: 2024-02-07
Payer: MEDICARE

## 2024-02-07 ENCOUNTER — HOSPITAL ENCOUNTER (INPATIENT)
Age: 74
LOS: 1 days | Discharge: HOME | DRG: 658 | End: 2024-02-08
Attending: UROLOGY | Admitting: UROLOGY
Payer: MEDICARE

## 2024-02-07 DIAGNOSIS — G89.18 ACUTE POST-OPERATIVE PAIN: Primary | ICD-10-CM

## 2024-02-07 DIAGNOSIS — N28.89 RENAL MASS: ICD-10-CM

## 2024-02-07 LAB
ANION GAP SERPL CALCULATED.3IONS-SCNC: 8 MMOL/L (ref 9–17)
BUN SERPL-MCNC: 13 MG/DL (ref 8–23)
CALCIUM SERPL-MCNC: 9 MG/DL (ref 8.6–10.4)
CHLORIDE SERPL-SCNC: 106 MMOL/L (ref 98–107)
CO2 SERPL-SCNC: 25 MMOL/L (ref 20–31)
CREAT SERPL-MCNC: 0.9 MG/DL (ref 0.7–1.2)
ERYTHROCYTE [DISTWIDTH] IN BLOOD BY AUTOMATED COUNT: 20.2 % (ref 11.8–14.4)
GFR SERPL CREATININE-BSD FRML MDRD: >60 ML/MIN/1.73M2
GLUCOSE SERPL-MCNC: 143 MG/DL (ref 70–99)
HCT VFR BLD AUTO: 38.3 % (ref 40.7–50.3)
HGB BLD-MCNC: 11.6 G/DL (ref 13–17)
MCH RBC QN AUTO: 23.1 PG (ref 25.2–33.5)
MCHC RBC AUTO-ENTMCNC: 30.3 G/DL (ref 28.4–34.8)
MCV RBC AUTO: 76.1 FL (ref 82.6–102.9)
NRBC BLD-RTO: 0 PER 100 WBC
PLATELET # BLD AUTO: 280 K/UL (ref 138–453)
PMV BLD AUTO: 10.3 FL (ref 8.1–13.5)
POTASSIUM SERPL-SCNC: 4.1 MMOL/L (ref 3.7–5.3)
RBC # BLD AUTO: 5.03 M/UL (ref 4.21–5.77)
SODIUM SERPL-SCNC: 139 MMOL/L (ref 135–144)
WBC OTHER # BLD: 10 K/UL (ref 3.5–11.3)

## 2024-02-07 PROCEDURE — 2580000003 HC RX 258: Performed by: NURSE ANESTHETIST, CERTIFIED REGISTERED

## 2024-02-07 PROCEDURE — 2580000003 HC RX 258: Performed by: PHYSICIAN ASSISTANT

## 2024-02-07 PROCEDURE — 80048 BASIC METABOLIC PNL TOTAL CA: CPT

## 2024-02-07 PROCEDURE — 6360000002 HC RX W HCPCS: Performed by: ANESTHESIOLOGY

## 2024-02-07 PROCEDURE — 3700000000 HC ANESTHESIA ATTENDED CARE: Performed by: UROLOGY

## 2024-02-07 PROCEDURE — 6360000002 HC RX W HCPCS: Performed by: UROLOGY

## 2024-02-07 PROCEDURE — 3600000009 HC SURGERY ROBOT BASE: Performed by: UROLOGY

## 2024-02-07 PROCEDURE — 6370000000 HC RX 637 (ALT 250 FOR IP): Performed by: PHYSICIAN ASSISTANT

## 2024-02-07 PROCEDURE — 1200000000 HC SEMI PRIVATE

## 2024-02-07 PROCEDURE — 6360000002 HC RX W HCPCS: Performed by: NURSE ANESTHETIST, CERTIFIED REGISTERED

## 2024-02-07 PROCEDURE — 0TT04ZZ RESECTION OF RIGHT KIDNEY, PERCUTANEOUS ENDOSCOPIC APPROACH: ICD-10-PCS | Performed by: UROLOGY

## 2024-02-07 PROCEDURE — 2720000010 HC SURG SUPPLY STERILE: Performed by: UROLOGY

## 2024-02-07 PROCEDURE — 2500000003 HC RX 250 WO HCPCS: Performed by: NURSE ANESTHETIST, CERTIFIED REGISTERED

## 2024-02-07 PROCEDURE — C1889 IMPLANT/INSERT DEVICE, NOC: HCPCS | Performed by: UROLOGY

## 2024-02-07 PROCEDURE — 6370000000 HC RX 637 (ALT 250 FOR IP): Performed by: NURSE ANESTHETIST, CERTIFIED REGISTERED

## 2024-02-07 PROCEDURE — 94760 N-INVAS EAR/PLS OXIMETRY 1: CPT

## 2024-02-07 PROCEDURE — 6370000000 HC RX 637 (ALT 250 FOR IP): Performed by: STUDENT IN AN ORGANIZED HEALTH CARE EDUCATION/TRAINING PROGRAM

## 2024-02-07 PROCEDURE — 8E0W4CZ ROBOTIC ASSISTED PROCEDURE OF TRUNK REGION, PERCUTANEOUS ENDOSCOPIC APPROACH: ICD-10-PCS | Performed by: UROLOGY

## 2024-02-07 PROCEDURE — C1713 ANCHOR/SCREW BN/BN,TIS/BN: HCPCS | Performed by: UROLOGY

## 2024-02-07 PROCEDURE — S2900 ROBOTIC SURGICAL SYSTEM: HCPCS | Performed by: UROLOGY

## 2024-02-07 PROCEDURE — 85027 COMPLETE CBC AUTOMATED: CPT

## 2024-02-07 PROCEDURE — 7100000000 HC PACU RECOVERY - FIRST 15 MIN: Performed by: UROLOGY

## 2024-02-07 PROCEDURE — 6360000002 HC RX W HCPCS: Performed by: PHYSICIAN ASSISTANT

## 2024-02-07 PROCEDURE — 3700000001 HC ADD 15 MINUTES (ANESTHESIA): Performed by: UROLOGY

## 2024-02-07 PROCEDURE — 2580000003 HC RX 258: Performed by: UROLOGY

## 2024-02-07 PROCEDURE — 87086 URINE CULTURE/COLONY COUNT: CPT

## 2024-02-07 PROCEDURE — 2580000003 HC RX 258: Performed by: ANESTHESIOLOGY

## 2024-02-07 PROCEDURE — 7100000001 HC PACU RECOVERY - ADDTL 15 MIN: Performed by: UROLOGY

## 2024-02-07 PROCEDURE — 2709999900 HC NON-CHARGEABLE SUPPLY: Performed by: UROLOGY

## 2024-02-07 PROCEDURE — 3600000019 HC SURGERY ROBOT ADDTL 15MIN: Performed by: UROLOGY

## 2024-02-07 PROCEDURE — 2500000003 HC RX 250 WO HCPCS: Performed by: UROLOGY

## 2024-02-07 DEVICE — CLIP INT L POLYMER LOK LIG HEM O LOK (6EA/PK): Type: IMPLANTABLE DEVICE | Site: RENAL | Status: FUNCTIONAL

## 2024-02-07 RX ORDER — ALBUTEROL SULFATE 90 UG/1
AEROSOL, METERED RESPIRATORY (INHALATION) PRN
Status: DISCONTINUED | OUTPATIENT
Start: 2024-02-07 | End: 2024-02-07 | Stop reason: SDUPTHER

## 2024-02-07 RX ORDER — SODIUM CHLORIDE 0.9 % (FLUSH) 0.9 %
5-40 SYRINGE (ML) INJECTION EVERY 12 HOURS SCHEDULED
Status: DISCONTINUED | OUTPATIENT
Start: 2024-02-07 | End: 2024-02-07 | Stop reason: HOSPADM

## 2024-02-07 RX ORDER — POLYETHYLENE GLYCOL 3350 17 G/17G
17 POWDER, FOR SOLUTION ORAL DAILY
Status: DISCONTINUED | OUTPATIENT
Start: 2024-02-07 | End: 2024-02-08 | Stop reason: HOSPADM

## 2024-02-07 RX ORDER — ONDANSETRON 2 MG/ML
4 INJECTION INTRAMUSCULAR; INTRAVENOUS EVERY 6 HOURS PRN
Status: DISCONTINUED | OUTPATIENT
Start: 2024-02-07 | End: 2024-02-08 | Stop reason: HOSPADM

## 2024-02-07 RX ORDER — ONDANSETRON 4 MG/1
4 TABLET, ORALLY DISINTEGRATING ORAL EVERY 8 HOURS PRN
Status: DISCONTINUED | OUTPATIENT
Start: 2024-02-07 | End: 2024-02-08 | Stop reason: HOSPADM

## 2024-02-07 RX ORDER — MAGNESIUM HYDROXIDE 1200 MG/15ML
LIQUID ORAL CONTINUOUS PRN
Status: DISCONTINUED | OUTPATIENT
Start: 2024-02-07 | End: 2024-02-07 | Stop reason: HOSPADM

## 2024-02-07 RX ORDER — HEPARIN SODIUM 5000 [USP'U]/ML
5000 INJECTION, SOLUTION INTRAVENOUS; SUBCUTANEOUS ONCE
Status: COMPLETED | OUTPATIENT
Start: 2024-02-07 | End: 2024-02-07

## 2024-02-07 RX ORDER — MORPHINE SULFATE 2 MG/ML
2 INJECTION, SOLUTION INTRAMUSCULAR; INTRAVENOUS
Status: DISCONTINUED | OUTPATIENT
Start: 2024-02-07 | End: 2024-02-08 | Stop reason: HOSPADM

## 2024-02-07 RX ORDER — SODIUM CHLORIDE 0.9 % (FLUSH) 0.9 %
5-40 SYRINGE (ML) INJECTION PRN
Status: DISCONTINUED | OUTPATIENT
Start: 2024-02-07 | End: 2024-02-08 | Stop reason: HOSPADM

## 2024-02-07 RX ORDER — MORPHINE SULFATE 2 MG/ML
4 INJECTION, SOLUTION INTRAMUSCULAR; INTRAVENOUS
Status: DISCONTINUED | OUTPATIENT
Start: 2024-02-07 | End: 2024-02-08 | Stop reason: HOSPADM

## 2024-02-07 RX ORDER — ONDANSETRON 2 MG/ML
INJECTION INTRAMUSCULAR; INTRAVENOUS PRN
Status: DISCONTINUED | OUTPATIENT
Start: 2024-02-07 | End: 2024-02-07 | Stop reason: SDUPTHER

## 2024-02-07 RX ORDER — HYDRALAZINE HYDROCHLORIDE 20 MG/ML
5 INJECTION INTRAMUSCULAR; INTRAVENOUS EVERY 6 HOURS PRN
Status: DISCONTINUED | OUTPATIENT
Start: 2024-02-07 | End: 2024-02-08 | Stop reason: HOSPADM

## 2024-02-07 RX ORDER — ACETAMINOPHEN 325 MG/1
650 TABLET ORAL EVERY 6 HOURS
Status: DISCONTINUED | OUTPATIENT
Start: 2024-02-07 | End: 2024-02-08 | Stop reason: HOSPADM

## 2024-02-07 RX ORDER — GABAPENTIN 300 MG/1
900 CAPSULE ORAL NIGHTLY
Status: DISCONTINUED | OUTPATIENT
Start: 2024-02-07 | End: 2024-02-08 | Stop reason: HOSPADM

## 2024-02-07 RX ORDER — OXYCODONE HYDROCHLORIDE 5 MG/1
5 TABLET ORAL EVERY 4 HOURS PRN
Status: DISCONTINUED | OUTPATIENT
Start: 2024-02-07 | End: 2024-02-08 | Stop reason: HOSPADM

## 2024-02-07 RX ORDER — OXYCODONE HYDROCHLORIDE 5 MG/1
10 TABLET ORAL EVERY 4 HOURS PRN
Status: DISCONTINUED | OUTPATIENT
Start: 2024-02-07 | End: 2024-02-08 | Stop reason: HOSPADM

## 2024-02-07 RX ORDER — NICOTINE 21 MG/24HR
1 PATCH, TRANSDERMAL 24 HOURS TRANSDERMAL DAILY
Status: DISCONTINUED | OUTPATIENT
Start: 2024-02-07 | End: 2024-02-08 | Stop reason: HOSPADM

## 2024-02-07 RX ORDER — SODIUM CHLORIDE 9 MG/ML
INJECTION, SOLUTION INTRAVENOUS PRN
Status: DISCONTINUED | OUTPATIENT
Start: 2024-02-07 | End: 2024-02-08 | Stop reason: HOSPADM

## 2024-02-07 RX ORDER — SODIUM CHLORIDE, SODIUM LACTATE, POTASSIUM CHLORIDE, CALCIUM CHLORIDE 600; 310; 30; 20 MG/100ML; MG/100ML; MG/100ML; MG/100ML
INJECTION, SOLUTION INTRAVENOUS CONTINUOUS PRN
Status: DISCONTINUED | OUTPATIENT
Start: 2024-02-07 | End: 2024-02-07 | Stop reason: SDUPTHER

## 2024-02-07 RX ORDER — DEXAMETHASONE SODIUM PHOSPHATE 10 MG/ML
INJECTION INTRAMUSCULAR; INTRAVENOUS PRN
Status: DISCONTINUED | OUTPATIENT
Start: 2024-02-07 | End: 2024-02-07 | Stop reason: SDUPTHER

## 2024-02-07 RX ORDER — PROPOFOL 10 MG/ML
INJECTION, EMULSION INTRAVENOUS PRN
Status: DISCONTINUED | OUTPATIENT
Start: 2024-02-07 | End: 2024-02-07 | Stop reason: SDUPTHER

## 2024-02-07 RX ORDER — MORPHINE SULFATE 2 MG/ML
4 INJECTION, SOLUTION INTRAMUSCULAR; INTRAVENOUS ONCE
Status: COMPLETED | OUTPATIENT
Start: 2024-02-07 | End: 2024-02-07

## 2024-02-07 RX ORDER — TAMSULOSIN HYDROCHLORIDE 0.4 MG/1
0.4 CAPSULE ORAL DAILY
Status: DISCONTINUED | OUTPATIENT
Start: 2024-02-07 | End: 2024-02-08 | Stop reason: HOSPADM

## 2024-02-07 RX ORDER — SODIUM CHLORIDE 9 MG/ML
INJECTION, SOLUTION INTRAVENOUS PRN
Status: DISCONTINUED | OUTPATIENT
Start: 2024-02-07 | End: 2024-02-07 | Stop reason: HOSPADM

## 2024-02-07 RX ORDER — CEFEPIME HYDROCHLORIDE 2 G/1
INJECTION, POWDER, FOR SOLUTION INTRAVENOUS PRN
Status: DISCONTINUED | OUTPATIENT
Start: 2024-02-07 | End: 2024-02-07 | Stop reason: SDUPTHER

## 2024-02-07 RX ORDER — LIDOCAINE HYDROCHLORIDE 10 MG/ML
INJECTION, SOLUTION EPIDURAL; INFILTRATION; INTRACAUDAL; PERINEURAL PRN
Status: DISCONTINUED | OUTPATIENT
Start: 2024-02-07 | End: 2024-02-07 | Stop reason: SDUPTHER

## 2024-02-07 RX ORDER — SODIUM CHLORIDE, SODIUM LACTATE, POTASSIUM CHLORIDE, CALCIUM CHLORIDE 600; 310; 30; 20 MG/100ML; MG/100ML; MG/100ML; MG/100ML
INJECTION, SOLUTION INTRAVENOUS CONTINUOUS
Status: DISCONTINUED | OUTPATIENT
Start: 2024-02-07 | End: 2024-02-07 | Stop reason: HOSPADM

## 2024-02-07 RX ORDER — FENTANYL CITRATE 50 UG/ML
INJECTION, SOLUTION INTRAMUSCULAR; INTRAVENOUS PRN
Status: DISCONTINUED | OUTPATIENT
Start: 2024-02-07 | End: 2024-02-07 | Stop reason: SDUPTHER

## 2024-02-07 RX ORDER — SODIUM CHLORIDE 9 MG/ML
INJECTION, SOLUTION INTRAVENOUS CONTINUOUS
Status: DISCONTINUED | OUTPATIENT
Start: 2024-02-07 | End: 2024-02-08 | Stop reason: HOSPADM

## 2024-02-07 RX ORDER — MIDAZOLAM HYDROCHLORIDE 1 MG/ML
INJECTION INTRAMUSCULAR; INTRAVENOUS PRN
Status: DISCONTINUED | OUTPATIENT
Start: 2024-02-07 | End: 2024-02-07 | Stop reason: SDUPTHER

## 2024-02-07 RX ORDER — SODIUM CHLORIDE 0.9 % (FLUSH) 0.9 %
5-40 SYRINGE (ML) INJECTION PRN
Status: DISCONTINUED | OUTPATIENT
Start: 2024-02-07 | End: 2024-02-07 | Stop reason: HOSPADM

## 2024-02-07 RX ORDER — AMLODIPINE BESYLATE 5 MG/1
5 TABLET ORAL DAILY
Status: DISCONTINUED | OUTPATIENT
Start: 2024-02-07 | End: 2024-02-08 | Stop reason: HOSPADM

## 2024-02-07 RX ORDER — BUPIVACAINE HYDROCHLORIDE AND EPINEPHRINE 5; 5 MG/ML; UG/ML
INJECTION, SOLUTION PERINEURAL PRN
Status: DISCONTINUED | OUTPATIENT
Start: 2024-02-07 | End: 2024-02-07 | Stop reason: ALTCHOICE

## 2024-02-07 RX ORDER — SODIUM CHLORIDE 0.9 % (FLUSH) 0.9 %
5-40 SYRINGE (ML) INJECTION EVERY 12 HOURS SCHEDULED
Status: DISCONTINUED | OUTPATIENT
Start: 2024-02-07 | End: 2024-02-08 | Stop reason: HOSPADM

## 2024-02-07 RX ORDER — MINERAL OIL AND WHITE PETROLATUM 150; 830 MG/G; MG/G
OINTMENT OPHTHALMIC PRN
Status: DISCONTINUED | OUTPATIENT
Start: 2024-02-07 | End: 2024-02-08 | Stop reason: HOSPADM

## 2024-02-07 RX ORDER — METOPROLOL TARTRATE 1 MG/ML
INJECTION, SOLUTION INTRAVENOUS PRN
Status: DISCONTINUED | OUTPATIENT
Start: 2024-02-07 | End: 2024-02-07 | Stop reason: SDUPTHER

## 2024-02-07 RX ORDER — METHOCARBAMOL 500 MG/1
500 TABLET, FILM COATED ORAL 4 TIMES DAILY
Status: DISCONTINUED | OUTPATIENT
Start: 2024-02-07 | End: 2024-02-08 | Stop reason: HOSPADM

## 2024-02-07 RX ORDER — GABAPENTIN 300 MG/1
300 CAPSULE ORAL DAILY
Status: DISCONTINUED | OUTPATIENT
Start: 2024-02-07 | End: 2024-02-08 | Stop reason: HOSPADM

## 2024-02-07 RX ORDER — PHENYLEPHRINE HCL IN 0.9% NACL 1 MG/10 ML
SYRINGE (ML) INTRAVENOUS PRN
Status: DISCONTINUED | OUTPATIENT
Start: 2024-02-07 | End: 2024-02-07 | Stop reason: SDUPTHER

## 2024-02-07 RX ORDER — ROCURONIUM BROMIDE 10 MG/ML
INJECTION, SOLUTION INTRAVENOUS PRN
Status: DISCONTINUED | OUTPATIENT
Start: 2024-02-07 | End: 2024-02-07 | Stop reason: SDUPTHER

## 2024-02-07 RX ORDER — NITROGLYCERIN 0.4 MG/1
0.4 TABLET SUBLINGUAL EVERY 5 MIN PRN
Status: DISCONTINUED | OUTPATIENT
Start: 2024-02-07 | End: 2024-02-08 | Stop reason: HOSPADM

## 2024-02-07 RX ORDER — LEVOFLOXACIN 5 MG/ML
500 INJECTION, SOLUTION INTRAVENOUS ONCE
Status: COMPLETED | OUTPATIENT
Start: 2024-02-07 | End: 2024-02-08

## 2024-02-07 RX ADMIN — FENTANYL CITRATE 50 MCG: 0.05 INJECTION, SOLUTION INTRAMUSCULAR; INTRAVENOUS at 14:50

## 2024-02-07 RX ADMIN — HYOSCYAMINE SULFATE 0.12 MG: 0.12 TABLET ORAL; SUBLINGUAL at 17:18

## 2024-02-07 RX ADMIN — LIDOCAINE HYDROCHLORIDE 50 MG: 10 INJECTION, SOLUTION EPIDURAL; INFILTRATION; INTRACAUDAL; PERINEURAL at 14:02

## 2024-02-07 RX ADMIN — PROPOFOL 120 MG: 10 INJECTION, EMULSION INTRAVENOUS at 14:02

## 2024-02-07 RX ADMIN — FENTANYL CITRATE 50 MCG: 0.05 INJECTION, SOLUTION INTRAMUSCULAR; INTRAVENOUS at 14:44

## 2024-02-07 RX ADMIN — HEPARIN SODIUM 5000 UNITS: 5000 INJECTION INTRAVENOUS; SUBCUTANEOUS at 12:48

## 2024-02-07 RX ADMIN — Medication 100 MCG: at 14:29

## 2024-02-07 RX ADMIN — HYDROMORPHONE HYDROCHLORIDE 0.5 MG: 1 INJECTION, SOLUTION INTRAMUSCULAR; INTRAVENOUS; SUBCUTANEOUS at 17:00

## 2024-02-07 RX ADMIN — ROCURONIUM BROMIDE 20 MG: 10 INJECTION, SOLUTION INTRAVENOUS at 14:41

## 2024-02-07 RX ADMIN — CEFEPIME 2 G: 2 INJECTION, POWDER, FOR SOLUTION INTRAVENOUS at 14:28

## 2024-02-07 RX ADMIN — FENTANYL CITRATE 50 MCG: 0.05 INJECTION, SOLUTION INTRAMUSCULAR; INTRAVENOUS at 15:44

## 2024-02-07 RX ADMIN — FENTANYL CITRATE 50 MCG: 0.05 INJECTION, SOLUTION INTRAMUSCULAR; INTRAVENOUS at 15:26

## 2024-02-07 RX ADMIN — ROCURONIUM BROMIDE 20 MG: 10 INJECTION, SOLUTION INTRAVENOUS at 15:59

## 2024-02-07 RX ADMIN — MORPHINE SULFATE 4 MG: 2 INJECTION, SOLUTION INTRAMUSCULAR; INTRAVENOUS at 17:32

## 2024-02-07 RX ADMIN — FENTANYL CITRATE 50 MCG: 0.05 INJECTION, SOLUTION INTRAMUSCULAR; INTRAVENOUS at 14:20

## 2024-02-07 RX ADMIN — SODIUM CHLORIDE, PRESERVATIVE FREE 10 ML: 5 INJECTION INTRAVENOUS at 23:04

## 2024-02-07 RX ADMIN — DEXAMETHASONE SODIUM PHOSPHATE 10 MG: 10 INJECTION INTRAMUSCULAR; INTRAVENOUS at 14:08

## 2024-02-07 RX ADMIN — OXYCODONE 10 MG: 5 TABLET ORAL at 17:44

## 2024-02-07 RX ADMIN — POLYETHYLENE GLYCOL 3350 17 G: 17 POWDER, FOR SOLUTION ORAL at 23:03

## 2024-02-07 RX ADMIN — ROCURONIUM BROMIDE 50 MG: 10 INJECTION, SOLUTION INTRAVENOUS at 14:03

## 2024-02-07 RX ADMIN — HYDROMORPHONE HYDROCHLORIDE 0.25 MG: 1 INJECTION, SOLUTION INTRAMUSCULAR; INTRAVENOUS; SUBCUTANEOUS at 17:15

## 2024-02-07 RX ADMIN — ALBUTEROL SULFATE 10 PUFF: 90 AEROSOL, METERED RESPIRATORY (INHALATION) at 14:10

## 2024-02-07 RX ADMIN — FENTANYL CITRATE 50 MCG: 0.05 INJECTION, SOLUTION INTRAMUSCULAR; INTRAVENOUS at 14:02

## 2024-02-07 RX ADMIN — LEVOFLOXACIN 500 MG: 5 INJECTION, SOLUTION INTRAVENOUS at 23:39

## 2024-02-07 RX ADMIN — HYDROMORPHONE HYDROCHLORIDE 0.5 MG: 1 INJECTION, SOLUTION INTRAMUSCULAR; INTRAVENOUS; SUBCUTANEOUS at 17:06

## 2024-02-07 RX ADMIN — SODIUM CHLORIDE, POTASSIUM CHLORIDE, SODIUM LACTATE AND CALCIUM CHLORIDE: 600; 310; 30; 20 INJECTION, SOLUTION INTRAVENOUS at 13:57

## 2024-02-07 RX ADMIN — MIDAZOLAM 2 MG: 1 INJECTION INTRAMUSCULAR; INTRAVENOUS at 13:55

## 2024-02-07 RX ADMIN — PHENYLEPHRINE HYDROCHLORIDE 25 MCG/MIN: 10 INJECTION INTRAVENOUS at 14:34

## 2024-02-07 RX ADMIN — FENTANYL CITRATE 50 MCG: 0.05 INJECTION, SOLUTION INTRAMUSCULAR; INTRAVENOUS at 15:02

## 2024-02-07 RX ADMIN — SUGAMMADEX 200 MG: 100 INJECTION, SOLUTION INTRAVENOUS at 16:34

## 2024-02-07 RX ADMIN — FENTANYL CITRATE 50 MCG: 0.05 INJECTION, SOLUTION INTRAMUSCULAR; INTRAVENOUS at 15:35

## 2024-02-07 RX ADMIN — FENTANYL CITRATE 50 MCG: 0.05 INJECTION, SOLUTION INTRAMUSCULAR; INTRAVENOUS at 15:17

## 2024-02-07 RX ADMIN — OXYCODONE 10 MG: 5 TABLET ORAL at 21:03

## 2024-02-07 RX ADMIN — SODIUM CHLORIDE, POTASSIUM CHLORIDE, SODIUM LACTATE AND CALCIUM CHLORIDE: 600; 310; 30; 20 INJECTION, SOLUTION INTRAVENOUS at 12:45

## 2024-02-07 RX ADMIN — FENTANYL CITRATE 50 MCG: 0.05 INJECTION, SOLUTION INTRAMUSCULAR; INTRAVENOUS at 16:25

## 2024-02-07 RX ADMIN — ONDANSETRON 4 MG: 2 INJECTION INTRAMUSCULAR; INTRAVENOUS at 16:34

## 2024-02-07 RX ADMIN — ACETAMINOPHEN 325MG 650 MG: 325 TABLET ORAL at 23:03

## 2024-02-07 RX ADMIN — METOPROLOL TARTRATE 12.5 MG: 25 TABLET, FILM COATED ORAL at 23:11

## 2024-02-07 RX ADMIN — MORPHINE SULFATE 4 MG: 2 INJECTION, SOLUTION INTRAMUSCULAR; INTRAVENOUS at 23:03

## 2024-02-07 RX ADMIN — HYDROMORPHONE HYDROCHLORIDE 0.25 MG: 1 INJECTION, SOLUTION INTRAMUSCULAR; INTRAVENOUS; SUBCUTANEOUS at 17:20

## 2024-02-07 RX ADMIN — ROCURONIUM BROMIDE 15 MG: 10 INJECTION, SOLUTION INTRAVENOUS at 15:27

## 2024-02-07 RX ADMIN — FENTANYL CITRATE 50 MCG: 0.05 INJECTION, SOLUTION INTRAMUSCULAR; INTRAVENOUS at 14:47

## 2024-02-07 RX ADMIN — ROCURONIUM BROMIDE 15 MG: 10 INJECTION, SOLUTION INTRAVENOUS at 15:12

## 2024-02-07 RX ADMIN — METHOCARBAMOL 500 MG: 500 TABLET ORAL at 23:04

## 2024-02-07 RX ADMIN — SODIUM CHLORIDE: 9 INJECTION, SOLUTION INTRAVENOUS at 21:10

## 2024-02-07 RX ADMIN — FENTANYL CITRATE 50 MCG: 0.05 INJECTION, SOLUTION INTRAMUSCULAR; INTRAVENOUS at 14:38

## 2024-02-07 RX ADMIN — METOPROLOL TARTRATE 3 MG: 5 INJECTION INTRAVENOUS at 15:52

## 2024-02-07 RX ADMIN — GABAPENTIN 900 MG: 300 CAPSULE ORAL at 21:03

## 2024-02-07 RX ADMIN — METOPROLOL TARTRATE 2 MG: 5 INJECTION INTRAVENOUS at 15:49

## 2024-02-07 ASSESSMENT — PAIN DESCRIPTION - LOCATION
LOCATION: ABDOMEN

## 2024-02-07 ASSESSMENT — PAIN DESCRIPTION - FREQUENCY
FREQUENCY: INTERMITTENT
FREQUENCY: INTERMITTENT

## 2024-02-07 ASSESSMENT — PAIN DESCRIPTION - PAIN TYPE: TYPE: SURGICAL PAIN

## 2024-02-07 ASSESSMENT — PAIN SCALES - GENERAL
PAINLEVEL_OUTOF10: 10
PAINLEVEL_OUTOF10: 8
PAINLEVEL_OUTOF10: 10
PAINLEVEL_OUTOF10: 10
PAINLEVEL_OUTOF10: 8
PAINLEVEL_OUTOF10: 8
PAINLEVEL_OUTOF10: 6
PAINLEVEL_OUTOF10: 9
PAINLEVEL_OUTOF10: 8
PAINLEVEL_OUTOF10: 10

## 2024-02-07 ASSESSMENT — LIFESTYLE VARIABLES: SMOKING_STATUS: 1

## 2024-02-07 ASSESSMENT — PAIN DESCRIPTION - ONSET
ONSET: ON-GOING
ONSET: GRADUAL

## 2024-02-07 ASSESSMENT — PAIN DESCRIPTION - DESCRIPTORS
DESCRIPTORS: ACHING
DESCRIPTORS: ACHING;DISCOMFORT;PRESSURE;SORE;TENDER
DESCRIPTORS: ACHING;TENDER;SORE

## 2024-02-07 ASSESSMENT — PAIN - FUNCTIONAL ASSESSMENT
PAIN_FUNCTIONAL_ASSESSMENT: 0-10
PAIN_FUNCTIONAL_ASSESSMENT: PREVENTS OR INTERFERES SOME ACTIVE ACTIVITIES AND ADLS
PAIN_FUNCTIONAL_ASSESSMENT: PREVENTS OR INTERFERES SOME ACTIVE ACTIVITIES AND ADLS

## 2024-02-07 ASSESSMENT — ENCOUNTER SYMPTOMS: SHORTNESS OF BREATH: 1

## 2024-02-07 NOTE — OP NOTE
Operative Note      Patient: Moris Mcneil  YOB: 1950  MRN: [id number]    Date of Procedure: 2/7/2024    Pre-Op diagnosis: Right upper tract urothelial carcinoma    Post-Op Diagnosis: Same       Procedure(s):  XI ROBOTIC LAPAROSCOPIC NEPHROURETERECTOMY- RIGHT    Surgeon(s):  Pierre Iyer MD    Assistant:   First Assistant: Betina Ziegler  Resident: uRi Hernandez MD    Anesthesia: General    Estimated Blood Loss (mL): Minimal    Complications: None    Specimens:   ID Type Source Tests Collected by Time Destination   1 : URINE FROM FIRST BLAKE CATHETER INSERTION Urine Urine, indwelling catheter CULTURE, URINE Pierre Iyer MD 2/7/2024 1442    A : willie-caval lymph node Tissue Lymph Node SURGICAL PATHOLOGY Pierre Iyer MD 2/7/2024 1519    B : RIGHT KIDNEY AND RIGHT URETER Tissue Kidney SURGICAL PATHOLOGY Pierre Iyer MD 2/7/2024 1625        Implants:  Implant Name Type Inv. Item Serial No.  Lot No. LRB No. Used Action   CLIP INT L POLYMER GERARDO LIG HEM O GERARDO (6EA/PK) - [id number]  CLIP INT L POLYMER GERARDO LIG HEM O GERARDO (6EA/PK)  [employer] [id number] 5 Implanted         Drains:   Urinary Catheter 02/07/24 2 Way (Active)   Catheter Indications Perioperative use for selected surgical procedures 02/07/24 1653   Site Assessment No urethral drainage 02/07/24 1653   Urine Color Yellow 02/07/24 1653   Urine Appearance Clear 02/07/24 1653   Collection Container Standard 02/07/24 1653   Securement Method Securing device (Describe) 02/07/24 1653       Findings:   Removal of kidney, ureter, bladder cuff excision with negative margins  Precaval lymph node removed        INDICATIONS FOR PROCEDURE  Moris Mcneil is a 74 y.o. male with a history of UTUC cancer. The various treatment options were discussed and he elected for radical nephroureterectomy  The risks and benefits of the procedure as well as alternatives and complications were discussed and he consented.    DETAILS OF

## 2024-02-07 NOTE — INTERVAL H&P NOTE
Update History & Physical    The patient's History and Physical of January 29, 2024 was reviewed with the patient and I examined the patient. There was no change. The surgical site was confirmed by the patient and me.     Plan: The risks, benefits, expected outcome, and alternative to the recommended procedure have been discussed with the patient. Patient understands and wants to proceed with the procedure.     Electronically signed by Katharina Moser PA-C on 2/7/2024 at 12:31 PM

## 2024-02-07 NOTE — ANESTHESIA PRE PROCEDURE
Department of Anesthesiology  Preprocedure Note       Name:  Moris Mcneil   Age:  74 y.o.  :  1950                                          MRN:  [id number]         Date:  2024      Surgeon: Surgeon(s):  Pierre Iyer MD    Procedure: Procedure(s):  XI ROBOTIC LAPAROSCOPIC NEPHROURETERECTOMY    Medications prior to admission:   Prior to Admission medications    Medication Sig Start Date End Date Taking? Authorizing Provider   tamsulosin (FLOMAX) 0.4 MG capsule Take 1 capsule by mouth daily 23   Aracely Lizama MD   Hyoscyamine Sulfate SL (LEVSIN/SL) 0.125 MG SUBL Place 1 tablet under the tongue every 8 hours as needed (bladder spasms or stent pain) 23  Aracely Lizama MD   amLODIPine (NORVASC) 5 MG tablet Take 1 tablet by mouth daily    Luis Marmolejo MD   apixaban (ELIQUIS) 5 MG TABS tablet Take 1 tablet by mouth 2 times daily Pt. stopped 23 for OR 23. Mgmt. cardiology    Luis Marmolejo MD   aspirin 81 MG EC tablet Take 1 tablet by mouth daily Mgmt. Dr. [facility] 10/15/18   Luis Marmolejo MD   atorvastatin (LIPITOR) 80 MG tablet Take 1 tablet by mouth nightly 17   Luis Marmolejo MD   gabapentin (NEURONTIN) 300 MG capsule Take 1 capsule by mouth daily. am 19   Luis Marmolejo MD   metoprolol tartrate (LOPRESSOR) 25 MG tablet Take 0.5 tablets by mouth 2 times daily 17   Luis Marmolejo MD   Multiple Vitamin (MULTI-VITAMIN) TABS Take 1 tablet by mouth daily    Luis Marmolejo MD   nitroGLYCERIN (NITROSTAT) 0.4 MG SL tablet Place 1 tablet under the tongue every 5 minutes as needed for Chest pain 19   Luis Marmolejo MD   OXYCODONE-ACETAMINOPHEN PO Take 1 tablet by mouth 3 times daily as needed (back pain)     Luis Marmolejo MD   gabapentin (NEURONTIN) 300 MG capsule Take 3 capsules by mouth at bedtime.    Luis Marmolejo MD       Current medications:    Current 
115/75   12/06/23 (!) 140/79       NPO Status:                                                                                 BMI:   Wt Readings from Last 3 Encounters:   01/29/24 93.4 kg (206 lb)   12/12/23 86.2 kg (190 lb)   12/06/23 86.2 kg (190 lb)     There is no height or weight on file to calculate BMI.    CBC:   Lab Results   Component Value Date/Time    WBC 10.9 01/29/2024 02:21 PM    RBC 4.79 01/29/2024 02:21 PM    HGB 11.2 01/29/2024 02:21 PM    HCT 36.8 01/29/2024 02:21 PM    MCV 76.8 01/29/2024 02:21 PM    RDW 20.2 01/29/2024 02:21 PM     01/29/2024 02:21 PM       CMP:   Lab Results   Component Value Date/Time     01/29/2024 02:21 PM    K 4.0 01/29/2024 02:21 PM     01/29/2024 02:21 PM    CO2 24 01/29/2024 02:21 PM    BUN 13 01/29/2024 02:21 PM    CREATININE 0.8 01/29/2024 02:21 PM    LABGLOM >60 01/29/2024 02:21 PM    GLUCOSE 88 01/29/2024 02:21 PM       POC Tests:   No results for input(s): \"POCGLU\", \"POCNA\", \"POCK\", \"POCCL\", \"POCBUN\", \"POCHEMO\", \"POCHCT\" in the last 72 hours.    Coags: No results found for: \"PROTIME\", \"INR\", \"APTT\"    HCG (If Applicable): No results found for: \"PREGTESTUR\", \"PREGSERUM\", \"HCG\", \"HCGQUANT\"     ABGs: No results found for: \"PHART\", \"PO2ART\", \"GDC3KQK\", \"SPW7FHD\", \"BEART\", \"I3HTEAEU\"     Type & Screen (If Applicable):  No results found for: \"LABABO\", \"LABRH\"    Drug/Infectious Status (If Applicable):  No results found for: \"HIV\", \"HEPCAB\"    COVID-19 Screening (If Applicable): No results found for: \"COVID19\"           EKG 12/6/2023  Atrial fibrillation with slow ventricular response  Abnormal ECG  No previous ECGs available              Anesthesia Evaluation  Patient summary reviewed  Airway: Mallampati: III  TM distance: >3 FB   Neck ROM: limited  Mouth opening: > = 3 FB   Dental:    (+) other and edentulous      Pulmonary:   (+) pneumonia:  COPD:  shortness of breath:     decreased breath sounds    current smoker                          ROS comment:

## 2024-02-08 VITALS
HEIGHT: 72 IN | HEART RATE: 109 BPM | WEIGHT: 205 LBS | TEMPERATURE: 98.2 F | RESPIRATION RATE: 16 BRPM | DIASTOLIC BLOOD PRESSURE: 77 MMHG | SYSTOLIC BLOOD PRESSURE: 135 MMHG | OXYGEN SATURATION: 93 % | BODY MASS INDEX: 27.77 KG/M2

## 2024-02-08 LAB
ANION GAP SERPL CALCULATED.3IONS-SCNC: 9 MMOL/L (ref 9–17)
BUN SERPL-MCNC: 16 MG/DL (ref 8–23)
CALCIUM SERPL-MCNC: 8.4 MG/DL (ref 8.6–10.4)
CHLORIDE SERPL-SCNC: 109 MMOL/L (ref 98–107)
CO2 SERPL-SCNC: 23 MMOL/L (ref 20–31)
CREAT SERPL-MCNC: 0.8 MG/DL (ref 0.7–1.2)
ERYTHROCYTE [DISTWIDTH] IN BLOOD BY AUTOMATED COUNT: 20.5 % (ref 11.8–14.4)
GFR SERPL CREATININE-BSD FRML MDRD: >60 ML/MIN/1.73M2
GLUCOSE SERPL-MCNC: 112 MG/DL (ref 70–99)
HCT VFR BLD AUTO: 32.5 % (ref 40.7–50.3)
HGB BLD-MCNC: 9.7 G/DL (ref 13–17)
MCH RBC QN AUTO: 22.9 PG (ref 25.2–33.5)
MCHC RBC AUTO-ENTMCNC: 29.8 G/DL (ref 28.4–34.8)
MCV RBC AUTO: 76.8 FL (ref 82.6–102.9)
MICROORGANISM SPEC CULT: NO GROWTH
NRBC BLD-RTO: 0 PER 100 WBC
PLATELET # BLD AUTO: 242 K/UL (ref 138–453)
PMV BLD AUTO: 10.1 FL (ref 8.1–13.5)
POTASSIUM SERPL-SCNC: 4.7 MMOL/L (ref 3.7–5.3)
RBC # BLD AUTO: 4.23 M/UL (ref 4.21–5.77)
SODIUM SERPL-SCNC: 141 MMOL/L (ref 135–144)
SPECIMEN DESCRIPTION: NORMAL
WBC OTHER # BLD: 11.8 K/UL (ref 3.5–11.3)

## 2024-02-08 PROCEDURE — 80048 BASIC METABOLIC PNL TOTAL CA: CPT

## 2024-02-08 PROCEDURE — 6370000000 HC RX 637 (ALT 250 FOR IP): Performed by: PHYSICIAN ASSISTANT

## 2024-02-08 PROCEDURE — 6370000000 HC RX 637 (ALT 250 FOR IP): Performed by: STUDENT IN AN ORGANIZED HEALTH CARE EDUCATION/TRAINING PROGRAM

## 2024-02-08 PROCEDURE — 6360000002 HC RX W HCPCS: Performed by: PHYSICIAN ASSISTANT

## 2024-02-08 PROCEDURE — 36415 COLL VENOUS BLD VENIPUNCTURE: CPT

## 2024-02-08 PROCEDURE — 85027 COMPLETE CBC AUTOMATED: CPT

## 2024-02-08 RX ORDER — CIPROFLOXACIN 500 MG/1
500 TABLET, FILM COATED ORAL 2 TIMES DAILY
Qty: 6 TABLET | Refills: 0 | Status: SHIPPED | OUTPATIENT
Start: 2024-02-08 | End: 2024-02-11

## 2024-02-08 RX ORDER — DOCUSATE SODIUM 100 MG/1
100 CAPSULE, LIQUID FILLED ORAL 2 TIMES DAILY
Qty: 60 CAPSULE | Refills: 0 | Status: SHIPPED | OUTPATIENT
Start: 2024-02-08 | End: 2024-03-09

## 2024-02-08 RX ORDER — POLYETHYLENE GLYCOL 3350 17 G/17G
17 POWDER, FOR SOLUTION ORAL DAILY
Qty: 510 G | Refills: 0 | Status: SHIPPED | OUTPATIENT
Start: 2024-02-08 | End: 2024-03-09

## 2024-02-08 RX ORDER — HYDROCODONE BITARTRATE AND ACETAMINOPHEN 5; 325 MG/1; MG/1
1 TABLET ORAL EVERY 4 HOURS PRN
Qty: 12 TABLET | Refills: 0 | Status: SHIPPED | OUTPATIENT
Start: 2024-02-08 | End: 2024-02-11

## 2024-02-08 RX ADMIN — GABAPENTIN 300 MG: 300 CAPSULE ORAL at 08:40

## 2024-02-08 RX ADMIN — POLYETHYLENE GLYCOL 3350 17 G: 17 POWDER, FOR SOLUTION ORAL at 08:39

## 2024-02-08 RX ADMIN — ACETAMINOPHEN 325MG 650 MG: 325 TABLET ORAL at 13:17

## 2024-02-08 RX ADMIN — OXYCODONE 10 MG: 5 TABLET ORAL at 01:20

## 2024-02-08 RX ADMIN — METHOCARBAMOL 500 MG: 500 TABLET ORAL at 08:40

## 2024-02-08 RX ADMIN — TAMSULOSIN HYDROCHLORIDE 0.4 MG: 0.4 CAPSULE ORAL at 08:40

## 2024-02-08 RX ADMIN — MORPHINE SULFATE 4 MG: 2 INJECTION, SOLUTION INTRAMUSCULAR; INTRAVENOUS at 02:45

## 2024-02-08 RX ADMIN — METHOCARBAMOL 500 MG: 500 TABLET ORAL at 13:17

## 2024-02-08 RX ADMIN — AMLODIPINE BESYLATE 5 MG: 5 TABLET ORAL at 08:40

## 2024-02-08 RX ADMIN — OXYCODONE 10 MG: 5 TABLET ORAL at 08:39

## 2024-02-08 RX ADMIN — OXYCODONE 10 MG: 5 TABLET ORAL at 13:17

## 2024-02-08 RX ADMIN — ACETAMINOPHEN 325MG 650 MG: 325 TABLET ORAL at 08:39

## 2024-02-08 RX ADMIN — METOPROLOL TARTRATE 12.5 MG: 25 TABLET, FILM COATED ORAL at 08:40

## 2024-02-08 ASSESSMENT — PAIN DESCRIPTION - DESCRIPTORS: DESCRIPTORS: ACHING

## 2024-02-08 ASSESSMENT — PAIN SCALES - GENERAL
PAINLEVEL_OUTOF10: 8
PAINLEVEL_OUTOF10: 10

## 2024-02-08 ASSESSMENT — PAIN DESCRIPTION - LOCATION: LOCATION: ABDOMEN

## 2024-02-08 NOTE — CARE COORDINATION
Case Management Assessment  Initial Evaluation    Date/Time of Evaluation: 2/8/2024 11:40 AM  Assessment Completed by: Marlene Tony RN    If patient is discharged prior to next notation, then this note serves as note for discharge by case management.    Patient Name: Moris Mcneil                   YOB: 1950  Diagnosis: Renal mass [N28.89]  Right renal mass [N28.89]                   Date / Time: 2/7/2024 11:53 AM    Patient Admission Status: Inpatient   Readmission Risk (Low < 19, Mod (19-27), High > 27): Readmission Risk Score: 12.3    Current PCP: Digna Waddell MD  PCP verified by CM? Yes    Chart Reviewed: Yes      History Provided by: Patient  Patient Orientation: Alert and Oriented    Patient Cognition: Alert    Hospitalization in the last 30 days (Readmission):  No    If yes, Readmission Assessment in CM Navigator will be completed.    Advance Directives:      Code Status: Full Code   Patient's Primary Decision Maker is: Legal Next of Kin      Discharge Planning:    Patient lives with: Spouse/Significant Other Type of Home: House  Primary Care Giver: Self  Patient Support Systems include: Spouse/Significant Other   Current Financial resources: Medicare, Medicaid  Current community resources: None  Current services prior to admission: None            Current DME:              Type of Home Care services:  None    ADLS  Prior functional level: Independent in ADLs/IADLs  Current functional level: Independent in ADLs/IADLs    PT AM-PAC:   /24  OT AM-PAC:   /24    Family can provide assistance at DC: Yes  Would you like Case Management to discuss the discharge plan with any other family members/significant others, and if so, who? Yes  Plans to Return to Present Housing:    Other Identified Issues/Barriers to RETURNING to current housing: none  Potential Assistance needed at discharge: N/A            Potential DME:    Patient expects to discharge to:    Plan for transportation at discharge:

## 2024-02-08 NOTE — DISCHARGE INSTRUCTIONS
Pt ok to discharge home in good condition  No heavy lifting, >10 lbs for next 4-6 weeks  Pt should avoid strenuous activity   Pt should walk moderately at home, many short walks is more ideal than a single long walk daily  Pt ok to shower, no swimming, soaking, or tub baths  Pt may continue regular diet   Please call attending physician or hospital  with questions  Call or Present to ED if fever (> 101F), intractable nausea vomiting or pain.  Rx in chart     Pt should follow up with Dr. Iyer in office, call to confirm appointment    Patient to keep Byrd catheter upon discharge, will be removed in 7 to 10 days outpatient following a cystogram. Ciprofloxacin prescribed for Byrd catheter removal.

## 2024-02-08 NOTE — DISCHARGE SUMMARY
DISCHARGE SUMMARY NOTE:      Patient Identification  PATIENT: Moris Mcneil is a 74 y.o. male.  MRN: [id number]  :  1950  Admit Date:  2024  Discharge date:   2024                                  Disposition: home  Discharged Condition:  good  Discharge Diagnoses:   Patient Active Problem List   Diagnosis    Right renal mass   Upper tract urothelial cell carcinoma    Consults: none    Surgery: Robot-assisted radical right nephro ureterectomy    Patient Instructions:   Activity: Walk frequently  Diet: As tolerated  Patient told to follow up with urology in 2-week  Discharge Medications:      Medication List        ASK your doctor about these medications      amLODIPine 5 MG tablet  Commonly known as: NORVASC     aspirin 81 MG EC tablet     atorvastatin 80 MG tablet  Commonly known as: LIPITOR     Eliquis 5 MG Tabs tablet  Generic drug: apixaban     * gabapentin 300 MG capsule  Commonly known as: NEURONTIN     * gabapentin 300 MG capsule  Commonly known as: NEURONTIN     Hyoscyamine Sulfate SL 0.125 MG Subl  Commonly known as: Levsin/SL  Place 1 tablet under the tongue every 8 hours as needed (bladder spasms or stent pain)     metoprolol tartrate 25 MG tablet  Commonly known as: LOPRESSOR     Multi-Vitamin Tabs     nitroGLYCERIN 0.4 MG SL tablet  Commonly known as: NITROSTAT     OXYCODONE-ACETAMINOPHEN PO     tamsulosin 0.4 MG capsule  Commonly known as: FLOMAX  Take 1 capsule by mouth daily           * This list has 2 medication(s) that are the same as other medications prescribed for you. Read the directions carefully, and ask your doctor or other care provider to review them with you.                  Hospital course: Patient admitted overnight following radical right nephro ureterectomy.  Overnight, patient did very well, afebrile vital signs stable throughout his stay.  Patient was ambulating, passing flatus, tolerating regular diet, and pain was very well controlled on current regimen.  Patient

## 2024-02-08 NOTE — PROGRESS NOTES
Robbin Iyer, Stephanie Munson Mostafa, Andrés Patel   Urology Progress Note     Subjective:   Postop day 1 status post robotic assisted laparoscopic right radical nephroureterectomy  No acute events overnight  Patient ambulated without difficulty  Passage of flatus  Byrd catheter in place draining clear yellow urine    Patient Vitals for the past 24 hrs:   BP Temp Temp src Pulse Resp SpO2 Height Weight   02/08/24 0420 110/70 98.2 °F (36.8 °C) Oral 93 16 91 % -- --   02/08/24 0245 -- -- -- -- 16 -- -- --   02/08/24 0120 -- -- -- -- 16 -- -- --   02/07/24 2303 -- -- -- -- 16 -- -- --   02/07/24 2237 139/78 98 °F (36.7 °C) Oral (!) 105 16 92 % -- --   02/07/24 2045 (!) 158/93 97 °F (36.1 °C) Temporal 85 12 99 % -- --   02/07/24 2030 (!) 158/93 -- -- (!) 109 21 98 % -- --   02/07/24 2015 133/87 -- -- (!) 124 21 95 % -- --   02/07/24 1830 (!) 120/98 -- -- 98 16 98 % -- --   02/07/24 1815 (!) 140/84 -- -- (!) 105 25 97 % -- --   02/07/24 1800 (!) 142/74 -- -- 98 14 96 % -- --   02/07/24 1745 (!) 150/81 -- -- (!) 104 26 97 % -- --   02/07/24 1730 (!) 169/104 -- -- (!) 107 17 97 % -- --   02/07/24 1715 (!) 141/99 -- -- (!) 105 18 98 % -- --   02/07/24 1700 (!) 158/85 -- -- 87 20 94 % -- --   02/07/24 1653 (!) 144/81 97.1 °F (36.2 °C) Temporal 83 17 100 % -- --   02/07/24 1224 122/68 97.7 °F (36.5 °C) Temporal 71 16 95 % 1.829 m (6') 93 kg (205 lb)       Intake/Output Summary (Last 24 hours) at 2/8/2024 0740  Last data filed at 2/8/2024 0601  Gross per 24 hour   Intake 1180 ml   Output 1025 ml   Net 155 ml       Recent Labs     02/07/24  1808 02/08/24  0614   WBC 10.0 11.8*   HGB 11.6* 9.7*   HCT 38.3* 32.5*   MCV 76.1* 76.8*    242     Recent Labs     02/07/24  1808 02/08/24  0614    141   K 4.1 4.7    109*   CO2 25 23   BUN 13 16   CREATININE 0.9 0.8       No results for input(s): \"COLORU\", \"PHUR\", \"LABCAST\", \"WBCUA\", \"RBCUA\", \"MUCUS\", \"TRICHOMONAS\", \"YEAST\", \"BACTERIA\", \"CLARITYU\", \"SPECGRAV\",

## 2024-02-08 NOTE — ANESTHESIA POSTPROCEDURE EVALUATION
Department of Anesthesiology  Postprocedure Note    Patient: Moris Mcneil  MRN: [id number]  YOB: 1950  Date of evaluation: 2/7/2024    Procedure Summary     Date: 02/07/24 Room / Location: [address]    Anesthesia Start: 1357 Anesthesia Stop: 1655    Procedure: XI ROBOTIC LAPAROSCOPIC NEPHROURETERECTOMY (Right: Abdomen) Diagnosis:       Renal mass      (Renal mass [N28.89])    Surgeons: Pierre Iyer MD Responsible Provider: Tyshawn Staley MD    Anesthesia Type: general ASA Status: 3          Anesthesia Type: No value filed.    Akash Phase I: Akash Score: 9    Akash Phase II:      Anesthesia Post Evaluation    Patient location during evaluation: PACU  Patient participation: complete - patient participated  Level of consciousness: awake  Airway patency: patent  Nausea & Vomiting: no nausea and no vomiting  Cardiovascular status: blood pressure returned to baseline  Respiratory status: acceptable  Hydration status: euvolemic  Comments: BP (!) 120/98   Pulse 98   Temp 97.1 °F (36.2 °C) (Temporal)   Resp 16   Ht 1.829 m (6')   Wt 93 kg (205 lb)   SpO2 98%   BMI 27.80 kg/m²   Pain Assessment: Face, Legs, Activity, Cry, and Consolability (FLACC) Pain Level: 6  Pain management: adequate        No notable events documented.

## 2024-02-15 LAB — SURGICAL PATHOLOGY REPORT: NORMAL

## 2025-01-31 ENCOUNTER — HOSPITAL ENCOUNTER (INPATIENT)
Age: 75
LOS: 3 days | Discharge: HOME | DRG: 682 | End: 2025-02-03
Attending: EMERGENCY MEDICINE | Admitting: STUDENT IN AN ORGANIZED HEALTH CARE EDUCATION/TRAINING PROGRAM
Payer: MEDICARE

## 2025-01-31 DIAGNOSIS — R19.00 PELVIC MASS: ICD-10-CM

## 2025-01-31 DIAGNOSIS — N17.8 ACUTE RENAL FAILURE WITH OTHER SPECIFIED PATHOLOGICAL LESION IN KIDNEY (HCC): ICD-10-CM

## 2025-01-31 DIAGNOSIS — N17.9 ACUTE RENAL FAILURE, UNSPECIFIED ACUTE RENAL FAILURE TYPE (HCC): Primary | ICD-10-CM

## 2025-01-31 DIAGNOSIS — N17.0 ACUTE RENAL FAILURE WITH TUBULAR NECROSIS (HCC): ICD-10-CM

## 2025-01-31 PROBLEM — I10 HYPERTENSION: Status: ACTIVE | Noted: 2025-01-31

## 2025-01-31 PROBLEM — I47.19 ATRIOVENTRICULAR NODAL RE-ENTRY TACHYCARDIA (HCC): Status: ACTIVE | Noted: 2025-01-31

## 2025-01-31 PROBLEM — I48.20 CHRONIC ATRIAL FIBRILLATION (HCC): Status: ACTIVE | Noted: 2025-01-31

## 2025-01-31 PROBLEM — J44.9 COPD (CHRONIC OBSTRUCTIVE PULMONARY DISEASE) (HCC): Status: ACTIVE | Noted: 2025-01-31

## 2025-01-31 PROBLEM — E66.811 OBESITY (BMI 30.0-34.9): Status: ACTIVE | Noted: 2020-02-06

## 2025-01-31 PROBLEM — I25.119 ATHEROSCLEROSIS OF NATIVE CORONARY ARTERY OF NATIVE HEART WITH ANGINA PECTORIS (HCC): Status: ACTIVE | Noted: 2020-02-19

## 2025-01-31 PROBLEM — F17.200 CURRENT SMOKER: Status: ACTIVE | Noted: 2025-01-31

## 2025-01-31 PROBLEM — E78.5 HYPERLIPIDEMIA: Status: ACTIVE | Noted: 2025-01-31

## 2025-01-31 PROBLEM — Z95.5 PRESENCE OF STENT IN CORONARY ARTERY: Status: ACTIVE | Noted: 2025-01-31

## 2025-01-31 PROBLEM — K08.109 EDENTULOUS: Status: ACTIVE | Noted: 2025-01-31

## 2025-01-31 PROBLEM — Z95.5 S/P CORONARY ARTERY STENT PLACEMENT: Status: ACTIVE | Noted: 2020-02-26

## 2025-01-31 PROBLEM — Z90.5 H/O RIGHT NEPHRECTOMY: Status: ACTIVE | Noted: 2025-01-31

## 2025-01-31 LAB
ANION GAP SERPL CALCULATED.3IONS-SCNC: 17 MMOL/L (ref 9–16)
BACTERIA URNS QL MICRO: ABNORMAL
BASOPHILS # BLD: 0.19 K/UL (ref 0–0.2)
BASOPHILS NFR BLD: 2 % (ref 0–2)
BILIRUB UR QL STRIP: NEGATIVE
BUN SERPL-MCNC: 62 MG/DL (ref 8–23)
CALCIUM SERPL-MCNC: 9.2 MG/DL (ref 8.6–10.4)
CASTS #/AREA URNS LPF: ABNORMAL /LPF (ref 0–8)
CHLORIDE SERPL-SCNC: 110 MMOL/L (ref 98–107)
CHLORIDE UR-SCNC: 78 MMOL/L
CLARITY UR: CLEAR
CO2 SERPL-SCNC: 19 MMOL/L (ref 20–31)
COLOR UR: YELLOW
CREAT SERPL-MCNC: 7.2 MG/DL (ref 0.7–1.2)
CREAT UR-MCNC: 71 MG/DL (ref 39–259)
EOSINOPHIL # BLD: 0.09 K/UL (ref 0–0.44)
EOSINOPHILS RELATIVE PERCENT: 1 % (ref 1–4)
EPI CELLS #/AREA URNS HPF: ABNORMAL /HPF (ref 0–5)
ERYTHROCYTE [DISTWIDTH] IN BLOOD BY AUTOMATED COUNT: 23.2 % (ref 11.8–14.4)
FERRITIN SERPL-MCNC: 103 NG/ML
GFR, ESTIMATED: 7 ML/MIN/1.73M2
GLUCOSE SERPL-MCNC: 89 MG/DL (ref 74–99)
GLUCOSE UR STRIP-MCNC: NEGATIVE MG/DL
HCT VFR BLD AUTO: 34 % (ref 40.7–50.3)
HGB BLD-MCNC: 10.9 G/DL (ref 13–17)
HGB UR QL STRIP.AUTO: ABNORMAL
IMM GRANULOCYTES # BLD AUTO: 0.56 K/UL (ref 0–0.3)
IMM GRANULOCYTES NFR BLD: 6 %
INR PPP: 1.7
IRON SATN MFR SERPL: 6 % (ref 20–55)
IRON SERPL-MCNC: 16 UG/DL (ref 61–157)
KETONES UR STRIP-MCNC: NEGATIVE MG/DL
LEUKOCYTE ESTERASE UR QL STRIP: ABNORMAL
LYMPHOCYTES NFR BLD: 1.4 K/UL (ref 1.1–3.7)
LYMPHOCYTES RELATIVE PERCENT: 15 % (ref 24–43)
MCH RBC QN AUTO: 22.5 PG (ref 25.2–33.5)
MCHC RBC AUTO-ENTMCNC: 32.1 G/DL (ref 28.4–34.8)
MCV RBC AUTO: 70.1 FL (ref 82.6–102.9)
MONOCYTES NFR BLD: 1.12 K/UL (ref 0.1–1.2)
MONOCYTES NFR BLD: 12 % (ref 3–12)
MORPHOLOGY: NORMAL
NEUTROPHILS NFR BLD: 64 % (ref 36–65)
NEUTS SEG NFR BLD: 5.94 K/UL (ref 1.5–8.1)
NITRITE UR QL STRIP: NEGATIVE
NRBC BLD-RTO: 0 PER 100 WBC
PH UR STRIP: 5.5 [PH] (ref 5–8)
PLATELET # BLD AUTO: ABNORMAL K/UL (ref 138–453)
PLATELET, FLUORESCENCE: 210 K/UL (ref 138–453)
PLATELETS.RETICULATED NFR BLD AUTO: 3 % (ref 1.1–10.3)
POTASSIUM SERPL-SCNC: 4 MMOL/L (ref 3.7–5.3)
PROT UR STRIP-MCNC: ABNORMAL MG/DL
PROTHROMBIN TIME: 19.4 SEC (ref 11.7–14.9)
RBC # BLD AUTO: 4.85 M/UL (ref 4.21–5.77)
RBC #/AREA URNS HPF: ABNORMAL /HPF (ref 0–4)
SODIUM SERPL-SCNC: 146 MMOL/L (ref 136–145)
SODIUM UR-SCNC: 97 MMOL/L
SP GR UR STRIP: 1.01 (ref 1–1.03)
TIBC SERPL-MCNC: 252 UG/DL (ref 250–450)
TOTAL PROTEIN, URINE: 27 MG/DL
UNSATURATED IRON BINDING CAPACITY: 236 UG/DL (ref 112–347)
URINE TOTAL PROTEIN CREATININE RATIO: 0.38 (ref 0–0.2)
UROBILINOGEN UR STRIP-ACNC: NORMAL EU/DL (ref 0–1)
WBC #/AREA URNS HPF: ABNORMAL /HPF (ref 0–5)
WBC OTHER # BLD: 9.3 K/UL (ref 3.5–11.3)

## 2025-01-31 PROCEDURE — 84300 ASSAY OF URINE SODIUM: CPT

## 2025-01-31 PROCEDURE — 85610 PROTHROMBIN TIME: CPT

## 2025-01-31 PROCEDURE — 83550 IRON BINDING TEST: CPT

## 2025-01-31 PROCEDURE — 85025 COMPLETE CBC W/AUTO DIFF WBC: CPT

## 2025-01-31 PROCEDURE — 99285 EMERGENCY DEPT VISIT HI MDM: CPT

## 2025-01-31 PROCEDURE — 93005 ELECTROCARDIOGRAM TRACING: CPT | Performed by: STUDENT IN AN ORGANIZED HEALTH CARE EDUCATION/TRAINING PROGRAM

## 2025-01-31 PROCEDURE — 99223 1ST HOSP IP/OBS HIGH 75: CPT | Performed by: NURSE PRACTITIONER

## 2025-01-31 PROCEDURE — 2500000003 HC RX 250 WO HCPCS

## 2025-01-31 PROCEDURE — 82570 ASSAY OF URINE CREATININE: CPT

## 2025-01-31 PROCEDURE — 87086 URINE CULTURE/COLONY COUNT: CPT

## 2025-01-31 PROCEDURE — 85055 RETICULATED PLATELET ASSAY: CPT

## 2025-01-31 PROCEDURE — 80048 BASIC METABOLIC PNL TOTAL CA: CPT

## 2025-01-31 PROCEDURE — 81001 URINALYSIS AUTO W/SCOPE: CPT

## 2025-01-31 PROCEDURE — 83540 ASSAY OF IRON: CPT

## 2025-01-31 PROCEDURE — 2060000000 HC ICU INTERMEDIATE R&B

## 2025-01-31 PROCEDURE — 2580000003 HC RX 258

## 2025-01-31 PROCEDURE — 82728 ASSAY OF FERRITIN: CPT

## 2025-01-31 PROCEDURE — 84156 ASSAY OF PROTEIN URINE: CPT

## 2025-01-31 PROCEDURE — 6370000000 HC RX 637 (ALT 250 FOR IP): Performed by: NURSE PRACTITIONER

## 2025-01-31 PROCEDURE — 82436 ASSAY OF URINE CHLORIDE: CPT

## 2025-01-31 RX ORDER — SODIUM CHLORIDE 0.9 % (FLUSH) 0.9 %
5-40 SYRINGE (ML) INJECTION PRN
Status: DISCONTINUED | OUTPATIENT
Start: 2025-01-31 | End: 2025-02-03 | Stop reason: HOSPADM

## 2025-01-31 RX ORDER — ASPIRIN 81 MG/1
81 TABLET ORAL DAILY
Status: DISCONTINUED | OUTPATIENT
Start: 2025-02-01 | End: 2025-01-31

## 2025-01-31 RX ORDER — ACETAMINOPHEN 650 MG/1
650 SUPPOSITORY RECTAL EVERY 6 HOURS PRN
Status: DISCONTINUED | OUTPATIENT
Start: 2025-01-31 | End: 2025-02-03 | Stop reason: HOSPADM

## 2025-01-31 RX ORDER — SODIUM CHLORIDE 9 MG/ML
INJECTION, SOLUTION INTRAVENOUS PRN
Status: DISCONTINUED | OUTPATIENT
Start: 2025-01-31 | End: 2025-02-03 | Stop reason: HOSPADM

## 2025-01-31 RX ORDER — POLYETHYLENE GLYCOL 3350 17 G/17G
17 POWDER, FOR SOLUTION ORAL 2 TIMES DAILY
Status: DISCONTINUED | OUTPATIENT
Start: 2025-01-31 | End: 2025-02-03 | Stop reason: HOSPADM

## 2025-01-31 RX ORDER — ONDANSETRON 4 MG/1
4 TABLET, ORALLY DISINTEGRATING ORAL EVERY 8 HOURS PRN
Status: DISCONTINUED | OUTPATIENT
Start: 2025-01-31 | End: 2025-02-03 | Stop reason: HOSPADM

## 2025-01-31 RX ORDER — ACETAMINOPHEN 325 MG/1
650 TABLET ORAL EVERY 6 HOURS PRN
Status: DISCONTINUED | OUTPATIENT
Start: 2025-01-31 | End: 2025-02-03 | Stop reason: HOSPADM

## 2025-01-31 RX ORDER — ONDANSETRON 2 MG/ML
4 INJECTION INTRAMUSCULAR; INTRAVENOUS EVERY 6 HOURS PRN
Status: DISCONTINUED | OUTPATIENT
Start: 2025-01-31 | End: 2025-02-03 | Stop reason: HOSPADM

## 2025-01-31 RX ORDER — ATORVASTATIN CALCIUM 80 MG/1
80 TABLET, FILM COATED ORAL NIGHTLY
Status: DISCONTINUED | OUTPATIENT
Start: 2025-01-31 | End: 2025-02-03 | Stop reason: HOSPADM

## 2025-01-31 RX ORDER — SODIUM CHLORIDE 0.9 % (FLUSH) 0.9 %
5-40 SYRINGE (ML) INJECTION EVERY 12 HOURS SCHEDULED
Status: DISCONTINUED | OUTPATIENT
Start: 2025-01-31 | End: 2025-02-03 | Stop reason: HOSPADM

## 2025-01-31 RX ORDER — GABAPENTIN 300 MG/1
300 CAPSULE ORAL 2 TIMES DAILY
Status: DISCONTINUED | OUTPATIENT
Start: 2025-01-31 | End: 2025-01-31

## 2025-01-31 RX ORDER — AMLODIPINE BESYLATE 10 MG/1
5 TABLET ORAL DAILY
Status: DISCONTINUED | OUTPATIENT
Start: 2025-02-01 | End: 2025-01-31

## 2025-01-31 RX ORDER — GABAPENTIN 100 MG/1
100 CAPSULE ORAL 3 TIMES DAILY
Status: DISCONTINUED | OUTPATIENT
Start: 2025-02-01 | End: 2025-02-03 | Stop reason: HOSPADM

## 2025-01-31 RX ORDER — GLUCAGON 1 MG/ML
1 KIT INJECTION PRN
Status: DISCONTINUED | OUTPATIENT
Start: 2025-01-31 | End: 2025-01-31

## 2025-01-31 RX ORDER — AMLODIPINE BESYLATE 10 MG/1
10 TABLET ORAL DAILY
Status: DISCONTINUED | OUTPATIENT
Start: 2025-01-31 | End: 2025-02-03 | Stop reason: HOSPADM

## 2025-01-31 RX ORDER — TAMSULOSIN HYDROCHLORIDE 0.4 MG/1
0.4 CAPSULE ORAL DAILY
Status: DISCONTINUED | OUTPATIENT
Start: 2025-02-01 | End: 2025-02-03 | Stop reason: HOSPADM

## 2025-01-31 RX ORDER — OXYCODONE AND ACETAMINOPHEN 5; 325 MG/1; MG/1
1 TABLET ORAL EVERY 8 HOURS PRN
Status: DISCONTINUED | OUTPATIENT
Start: 2025-01-31 | End: 2025-02-03 | Stop reason: HOSPADM

## 2025-01-31 RX ORDER — METOPROLOL TARTRATE 25 MG/1
12.5 TABLET, FILM COATED ORAL 2 TIMES DAILY
Status: DISCONTINUED | OUTPATIENT
Start: 2025-01-31 | End: 2025-02-03 | Stop reason: HOSPADM

## 2025-01-31 RX ORDER — DEXTROSE MONOHYDRATE 100 MG/ML
INJECTION, SOLUTION INTRAVENOUS CONTINUOUS PRN
Status: DISCONTINUED | OUTPATIENT
Start: 2025-01-31 | End: 2025-01-31

## 2025-01-31 RX ADMIN — ATORVASTATIN CALCIUM 80 MG: 80 TABLET, FILM COATED ORAL at 23:16

## 2025-01-31 RX ADMIN — AMLODIPINE BESYLATE 10 MG: 10 TABLET ORAL at 23:16

## 2025-01-31 RX ADMIN — OXYCODONE HYDROCHLORIDE AND ACETAMINOPHEN 1 TABLET: 5; 325 TABLET ORAL at 23:26

## 2025-01-31 RX ADMIN — SODIUM BICARBONATE: 84 INJECTION, SOLUTION INTRAVENOUS at 20:29

## 2025-01-31 RX ADMIN — METOPROLOL TARTRATE 12.5 MG: 25 TABLET, FILM COATED ORAL at 23:16

## 2025-01-31 ASSESSMENT — PAIN SCALES - GENERAL
PAINLEVEL_OUTOF10: 4
PAINLEVEL_OUTOF10: 6

## 2025-01-31 ASSESSMENT — LIFESTYLE VARIABLES: HOW OFTEN DO YOU HAVE A DRINK CONTAINING ALCOHOL: NEVER

## 2025-01-31 ASSESSMENT — PAIN - FUNCTIONAL ASSESSMENT: PAIN_FUNCTIONAL_ASSESSMENT: NONE - DENIES PAIN

## 2025-01-31 ASSESSMENT — PAIN DESCRIPTION - LOCATION: LOCATION: BACK;KNEE

## 2025-01-31 NOTE — ED NOTES
The following labs were labeled with appropriate pt sticker and tubed to lab:     [x] Blue     [x] Lavender   [] on ice  [x] Green/yellow  [x] Green/black [] on ice  [] Yin  [] on ice  [x] Yellow  [] Red  [] Pink  [] Type/ Screen  [] ABG  [] VBG    [] COVID-19 swab    [] Rapid  [] PCR  [] Flu swab  [] Peds Viral Panel     [] Urine Sample  [] Fecal Sample  [] Pelvic Cultures  [] Blood Cultures  [] X 2  [] STREP Cultures  [] Wound Cultures

## 2025-01-31 NOTE — ED TRIAGE NOTES
Pt fell and went to [facility] to have the injury evaluated   Upon evaluation of the fall a mass was found on his kidney  Pt hx of mass to other kidney which is reportedly removed  Pt denied pain/nausea/CP SOB  Pt is alert o x self but seems to have periods of confusion noted

## 2025-01-31 NOTE — ED PROVIDER NOTES
[facility]     Emergency Department     Faculty Attestation    I performed a history and physical examination of the patient and discussed management with the resident. I reviewed the resident’s note and agree with the documented findings and plan of care. Any areas of disagreement are noted on the chart. I was personally present for the key portions of any procedures. I have documented in the chart those procedures where I was not present during the key portions. I have reviewed the emergency nurses triage note. I agree with the chief complaint, past medical history, past surgical history, allergies, medications, social and family history as documented unless otherwise noted below.        For Physician Assistant/ Nurse Practitioner cases/documentation I have personally evaluated this patient and have completed at least one if not all key elements of the E/M (history, physical exam, and MDM). Additional findings are as noted.  I have personally seen and evaluated the patient.  I find the patient's history and physical exam are consistent with the NP/PA documentation.  I agree with the care provided, treatment rendered, disposition and follow-up plan.          Critical Care     Chai De La Cruz M.D.  Attending Emergency  Physician           Chai De La Cruz MD  01/31/25 1416

## 2025-02-01 ENCOUNTER — APPOINTMENT (OUTPATIENT)
Dept: INTERVENTIONAL RADIOLOGY/VASCULAR | Age: 75
DRG: 682 | End: 2025-02-01
Payer: MEDICARE

## 2025-02-01 PROBLEM — N13.9 OBSTRUCTED, UROPATHY: Status: ACTIVE | Noted: 2025-02-01

## 2025-02-01 PROBLEM — R19.00 PELVIC MASS: Status: ACTIVE | Noted: 2025-02-01

## 2025-02-01 LAB
ALBUMIN SERPL-MCNC: 3.2 G/DL (ref 3.5–5.2)
ALBUMIN/GLOB SERPL: 0.8 {RATIO} (ref 1–2.5)
ALP SERPL-CCNC: 129 U/L (ref 40–129)
ALT SERPL-CCNC: 13 U/L (ref 10–50)
ANION GAP SERPL CALCULATED.3IONS-SCNC: 15 MMOL/L (ref 9–16)
AST SERPL-CCNC: 26 U/L (ref 10–50)
BASOPHILS # BLD: 0.25 K/UL (ref 0–0.2)
BASOPHILS NFR BLD: 3 % (ref 0–2)
BILIRUB SERPL-MCNC: 0.6 MG/DL (ref 0–1.2)
BUN SERPL-MCNC: 52 MG/DL (ref 8–23)
C3 SERPL-MCNC: 143 MG/DL (ref 90–180)
C4 SERPL-MCNC: 22 MG/DL (ref 10–40)
CALCIUM SERPL-MCNC: 9 MG/DL (ref 8.6–10.4)
CHLORIDE SERPL-SCNC: 112 MMOL/L (ref 98–107)
CO2 SERPL-SCNC: 20 MMOL/L (ref 20–31)
CREAT SERPL-MCNC: 5.4 MG/DL (ref 0.7–1.2)
CREAT UR-MCNC: 117 MG/DL (ref 39–259)
EKG ATRIAL RATE: 90 BPM
EKG Q-T INTERVAL: 362 MS
EKG QRS DURATION: 84 MS
EKG QTC CALCULATION (BAZETT): 422 MS
EKG R AXIS: 70 DEGREES
EKG T AXIS: 42 DEGREES
EKG VENTRICULAR RATE: 82 BPM
EOSINOPHIL # BLD: 0.17 K/UL (ref 0–0.4)
EOSINOPHILS RELATIVE PERCENT: 2 % (ref 1–4)
ERYTHROCYTE [DISTWIDTH] IN BLOOD BY AUTOMATED COUNT: 23.1 % (ref 11.8–14.4)
GFR, ESTIMATED: 11 ML/MIN/1.73M2
GLUCOSE BLD-MCNC: 133 MG/DL (ref 75–110)
GLUCOSE BLD-MCNC: 136 MG/DL (ref 75–110)
GLUCOSE SERPL-MCNC: 98 MG/DL (ref 74–99)
HCT VFR BLD AUTO: 33.7 % (ref 40.7–50.3)
HGB BLD-MCNC: 10.4 G/DL (ref 13–17)
IMM GRANULOCYTES # BLD AUTO: 0.17 K/UL (ref 0–0.3)
IMM GRANULOCYTES NFR BLD: 2 %
LYMPHOCYTES NFR BLD: 1.49 K/UL (ref 1–4.8)
LYMPHOCYTES RELATIVE PERCENT: 18 % (ref 24–44)
MAGNESIUM SERPL-MCNC: 2.2 MG/DL (ref 1.6–2.4)
MCH RBC QN AUTO: 22.1 PG (ref 25.2–33.5)
MCHC RBC AUTO-ENTMCNC: 30.9 G/DL (ref 28.4–34.8)
MCV RBC AUTO: 71.5 FL (ref 82.6–102.9)
MICROORGANISM SPEC CULT: NO GROWTH
MONOCYTES NFR BLD: 0.91 K/UL (ref 0.1–0.8)
MONOCYTES NFR BLD: 11 % (ref 1–7)
MORPHOLOGY: ABNORMAL
NEUTROPHILS NFR BLD: 64 % (ref 36–66)
NEUTS SEG NFR BLD: 5.31 K/UL (ref 1.8–7.7)
NRBC BLD-RTO: 0 PER 100 WBC
OSMOLALITY SERPL: 317 MOSM/KG (ref 275–295)
PLATELET # BLD AUTO: ABNORMAL K/UL (ref 138–453)
PLATELET, FLUORESCENCE: 213 K/UL (ref 138–453)
PLATELETS.RETICULATED NFR BLD AUTO: 2.6 % (ref 1.1–10.3)
POTASSIUM SERPL-SCNC: 3.7 MMOL/L (ref 3.7–5.3)
POTASSIUM, UR: 48.9 MMOL/L
PROT SERPL-MCNC: 7 G/DL (ref 6.6–8.7)
RBC # BLD AUTO: 4.71 M/UL (ref 4.21–5.77)
SERVICE CMNT-IMP: NORMAL
SODIUM SERPL-SCNC: 147 MMOL/L (ref 136–145)
SODIUM UR-SCNC: 107 MMOL/L
SODIUM UR-SCNC: 41 MMOL/L
SPECIMEN DESCRIPTION: NORMAL
TOTAL PROTEIN, URINE: 25 MG/DL
WBC OTHER # BLD: 8.3 K/UL (ref 3.5–11.3)

## 2025-02-01 PROCEDURE — 86160 COMPLEMENT ANTIGEN: CPT

## 2025-02-01 PROCEDURE — 6360000004 HC RX CONTRAST MEDICATION: Performed by: STUDENT IN AN ORGANIZED HEALTH CARE EDUCATION/TRAINING PROGRAM

## 2025-02-01 PROCEDURE — 85025 COMPLETE CBC W/AUTO DIFF WBC: CPT

## 2025-02-01 PROCEDURE — 80053 COMPREHEN METABOLIC PANEL: CPT

## 2025-02-01 PROCEDURE — C1729 CATH, DRAINAGE: HCPCS

## 2025-02-01 PROCEDURE — 2500000003 HC RX 250 WO HCPCS: Performed by: NURSE PRACTITIONER

## 2025-02-01 PROCEDURE — 84300 ASSAY OF URINE SODIUM: CPT

## 2025-02-01 PROCEDURE — 2500000003 HC RX 250 WO HCPCS: Performed by: STUDENT IN AN ORGANIZED HEALTH CARE EDUCATION/TRAINING PROGRAM

## 2025-02-01 PROCEDURE — 0T9130Z DRAINAGE OF LEFT KIDNEY WITH DRAINAGE DEVICE, PERCUTANEOUS APPROACH: ICD-10-PCS | Performed by: RADIOLOGY

## 2025-02-01 PROCEDURE — 82570 ASSAY OF URINE CREATININE: CPT

## 2025-02-01 PROCEDURE — 2580000003 HC RX 258

## 2025-02-01 PROCEDURE — 99222 1ST HOSP IP/OBS MODERATE 55: CPT | Performed by: INTERNAL MEDICINE

## 2025-02-01 PROCEDURE — 83735 ASSAY OF MAGNESIUM: CPT

## 2025-02-01 PROCEDURE — 85055 RETICULATED PLATELET ASSAY: CPT

## 2025-02-01 PROCEDURE — 83930 ASSAY OF BLOOD OSMOLALITY: CPT

## 2025-02-01 PROCEDURE — 6360000002 HC RX W HCPCS: Performed by: RADIOLOGY

## 2025-02-01 PROCEDURE — 2580000003 HC RX 258: Performed by: NURSE PRACTITIONER

## 2025-02-01 PROCEDURE — 2500000003 HC RX 250 WO HCPCS

## 2025-02-01 PROCEDURE — 2060000000 HC ICU INTERMEDIATE R&B

## 2025-02-01 PROCEDURE — 84133 ASSAY OF URINE POTASSIUM: CPT

## 2025-02-01 PROCEDURE — 50432 PLMT NEPHROSTOMY CATHETER: CPT

## 2025-02-01 PROCEDURE — 36415 COLL VENOUS BLD VENIPUNCTURE: CPT

## 2025-02-01 PROCEDURE — 93005 ELECTROCARDIOGRAM TRACING: CPT | Performed by: STUDENT IN AN ORGANIZED HEALTH CARE EDUCATION/TRAINING PROGRAM

## 2025-02-01 PROCEDURE — 82947 ASSAY GLUCOSE BLOOD QUANT: CPT

## 2025-02-01 PROCEDURE — 6370000000 HC RX 637 (ALT 250 FOR IP): Performed by: NURSE PRACTITIONER

## 2025-02-01 PROCEDURE — 99232 SBSQ HOSP IP/OBS MODERATE 35: CPT | Performed by: STUDENT IN AN ORGANIZED HEALTH CARE EDUCATION/TRAINING PROGRAM

## 2025-02-01 RX ORDER — CIPROFLOXACIN 2 MG/ML
INJECTION, SOLUTION INTRAVENOUS CONTINUOUS PRN
Status: COMPLETED | OUTPATIENT
Start: 2025-02-01 | End: 2025-02-01

## 2025-02-01 RX ORDER — ACETAMINOPHEN 325 MG/1
650 TABLET ORAL EVERY 4 HOURS PRN
Status: DISCONTINUED | OUTPATIENT
Start: 2025-02-01 | End: 2025-02-03 | Stop reason: HOSPADM

## 2025-02-01 RX ORDER — FENTANYL CITRATE 50 UG/ML
INJECTION, SOLUTION INTRAMUSCULAR; INTRAVENOUS PRN
Status: COMPLETED | OUTPATIENT
Start: 2025-02-01 | End: 2025-02-01

## 2025-02-01 RX ORDER — SODIUM CHLORIDE 450 MG/100ML
INJECTION, SOLUTION INTRAVENOUS CONTINUOUS
Status: DISCONTINUED | OUTPATIENT
Start: 2025-02-01 | End: 2025-02-03 | Stop reason: HOSPADM

## 2025-02-01 RX ORDER — METOPROLOL TARTRATE 1 MG/ML
5 INJECTION, SOLUTION INTRAVENOUS ONCE
Status: COMPLETED | OUTPATIENT
Start: 2025-02-01 | End: 2025-02-01

## 2025-02-01 RX ADMIN — METOPROLOL TARTRATE 12.5 MG: 25 TABLET, FILM COATED ORAL at 20:31

## 2025-02-01 RX ADMIN — IOHEXOL 25 ML: 240 INJECTION, SOLUTION INTRATHECAL; INTRAVASCULAR; INTRAVENOUS; ORAL at 10:36

## 2025-02-01 RX ADMIN — AMLODIPINE BESYLATE 10 MG: 10 TABLET ORAL at 08:46

## 2025-02-01 RX ADMIN — SODIUM CHLORIDE: 4.5 INJECTION, SOLUTION INTRAVENOUS at 20:30

## 2025-02-01 RX ADMIN — METOPROLOL TARTRATE 5 MG: 5 INJECTION INTRAVENOUS at 16:10

## 2025-02-01 RX ADMIN — TAMSULOSIN HYDROCHLORIDE 0.4 MG: 0.4 CAPSULE ORAL at 08:46

## 2025-02-01 RX ADMIN — FENTANYL CITRATE 50 MCG: 50 INJECTION, SOLUTION INTRAMUSCULAR; INTRAVENOUS at 10:23

## 2025-02-01 RX ADMIN — GABAPENTIN 100 MG: 100 CAPSULE ORAL at 08:46

## 2025-02-01 RX ADMIN — FENTANYL CITRATE 50 MCG: 50 INJECTION, SOLUTION INTRAMUSCULAR; INTRAVENOUS at 10:06

## 2025-02-01 RX ADMIN — POLYETHYLENE GLYCOL 3350 17 G: 17 POWDER, FOR SOLUTION ORAL at 20:31

## 2025-02-01 RX ADMIN — VASOPRESSIN 75 ML/HR: 20 INJECTION, SOLUTION INTRAVENOUS at 06:37

## 2025-02-01 RX ADMIN — GABAPENTIN 100 MG: 100 CAPSULE ORAL at 16:10

## 2025-02-01 RX ADMIN — METOPROLOL TARTRATE 12.5 MG: 25 TABLET, FILM COATED ORAL at 08:46

## 2025-02-01 RX ADMIN — SODIUM CHLORIDE, PRESERVATIVE FREE 10 ML: 5 INJECTION INTRAVENOUS at 08:46

## 2025-02-01 RX ADMIN — CIPROFLOXACIN 400 MG: 2 INJECTION, SOLUTION INTRAVENOUS at 10:04

## 2025-02-01 RX ADMIN — SODIUM CHLORIDE, PRESERVATIVE FREE 10 ML: 5 INJECTION INTRAVENOUS at 20:31

## 2025-02-01 RX ADMIN — ATORVASTATIN CALCIUM 80 MG: 80 TABLET, FILM COATED ORAL at 20:31

## 2025-02-01 RX ADMIN — GABAPENTIN 100 MG: 100 CAPSULE ORAL at 20:31

## 2025-02-01 ASSESSMENT — PAIN SCALES - GENERAL: PAINLEVEL_OUTOF10: 0

## 2025-02-01 NOTE — PLAN OF CARE
Problem: Safety - Adult  Goal: Free from fall injury  Outcome: Not Progressing     Problem: ABCDS Injury Assessment  Goal: Absence of physical injury  Outcome: Not Progressing     Problem: Skin/Tissue Integrity - Adult  Goal: Skin integrity remains intact  Outcome: Not Progressing  Flowsheets (Taken 2/1/2025 0800)  Skin Integrity Remains Intact: Monitor for areas of redness and/or skin breakdown     Problem: Genitourinary - Adult  Goal: Absence of urinary retention  Outcome: Not Progressing     Problem: Safety - Adult  Goal: Free from fall injury  Outcome: Not Progressing     Problem: ABCDS Injury Assessment  Goal: Absence of physical injury  Outcome: Not Progressing     Problem: Skin/Tissue Integrity - Adult  Goal: Skin integrity remains intact  Outcome: Not Progressing  Flowsheets (Taken 2/1/2025 0800)  Skin Integrity Remains Intact: Monitor for areas of redness and/or skin breakdown     Problem: Genitourinary - Adult  Goal: Absence of urinary retention  Outcome: Not Progressing

## 2025-02-01 NOTE — BRIEF OP NOTE
Brief Postoperative Note    Moris Mcneil  YOB: 1950  [id number]    Pre-operative Diagnosis: Bladder mass with obstructed left kidney    Post-operative Diagnosis: Same    Procedure: Left PCN    Medications Given: fentanyl    Anesthesia: Local    Surgeons/Assistants: AMARI GARRETT MD    Estimated Blood Loss: minimal    Complications: none    Specimens: were not obtained    Findings: 10 F left PCN inserted under US and fluoro. Good fxn and position demonstrated. Sutured to skin and dressed in std fashion. Pt tolerated well. VSS.      Electronically signed by AMARI GARRETT MD on 2/1/2025 at 10:38 AM

## 2025-02-01 NOTE — ED PROVIDER NOTES
[facility] ONC/MED SURG  Emergency Department Encounter  Emergency Medicine Resident     Pt Name:Moris Mcneil  MRN: [id number]  Birthdate 1950  Date of evaluation: 1/31/25  PCP:  Digna Waddell MD  Note Started: 10:33 PM EST      CHIEF COMPLAINT       Chief Complaint   Patient presents with    Mass     kidney       HISTORY OF PRESENT ILLNESS  (Location/Symptom, Timing/Onset, Context/Setting, Quality, Duration, Modifying Factors, Severity.)      Moris Mcneil is a 74 y.o. male, history of A-fib on Eliquis, CAD, GERD, right nephroureterectomy 2024, who presents as a transfer from [facility] for concerns of a pelvic mass.  Patient initially presented to the ED after a fall that he sustained several days ago.  He had been having progressive weakness and pain in his lower extremities.  On evaluation and at [facility] ER, CT abdomen pelvis revealed a left pelvic mass with moderate ureteral hydronephrosis associated with significantly elevated creatinine and.  Patient was transferred to hospital for higher level of care.  Patient denies any complaints on my evaluation.     PAST MEDICAL / SURGICAL / SOCIAL / FAMILY HISTORY      has a past medical history of Arthritis, At risk for falling, CAD (coronary artery disease), Chronic pain, Constipation, COPD (chronic obstructive pulmonary disease) ([facility]), COVID, COVID-19 vaccine series completed, Diverticulosis, Edentulous, GERD (gastroesophageal reflux disease), Hematuria, Hyperlipidemia, Hypertension, Kidney stones, Mobility impaired, New onset a-fib (HCC), NSTEMI (non-ST elevated myocardial infarction) ([facility]), Pneumonia, Poor historian, Pulmonary nodule, Renal cancer (HCC), Right renal mass, SOB (shortness of breath), Swelling, Tremor, Under care of team, Under care of team, Under care of team, Under care of team, Wears glasses, Weight loss, and Wellness examination.     has a past surgical history that includes back surgery (1991); Coronary

## 2025-02-01 NOTE — PLAN OF CARE
Problem: Safety - Adult  Goal: Free from fall injury  Outcome: Progressing     Problem: ABCDS Injury Assessment  Goal: Absence of physical injury  Outcome: Progressing     Problem: Skin/Tissue Integrity - Adult  Goal: Skin integrity remains intact  Outcome: Progressing     Problem: Genitourinary - Adult  Goal: Absence of urinary retention  Outcome: Progressing

## 2025-02-01 NOTE — H&P
mg/dL    Specific Hematite, UA 1.012 1.005 - 1.030    Urine Hgb MODERATE (A) NEGATIVE    pH, Urine 5.5 5.0 - 8.0    Protein, UA 1+ (A) NEGATIVE mg/dL    Urobilinogen, Urine Normal 0.0 - 1.0 EU/dL    Nitrite, Urine NEGATIVE NEGATIVE    Leukocyte Esterase, Urine TRACE (A) NEGATIVE    WBC, UA 5 TO 10 0 - 5 /HPF    RBC, UA 5 TO 10 0 - 4 /HPF    Casts UA  0 - 8 /LPF     0 TO 2 HYALINE Reference range defined for non-centrifuged specimen.    Epithelial Cells, UA 0 TO 2 0 - 5 /HPF    Bacteria, UA None None   SODIUM, URINE, RANDOM    Collection Time: 01/31/25  8:01 PM   Result Value Ref Range    Sodium, Ur 97 mmol/L   CHLORIDE, URINE, RANDOM    Collection Time: 01/31/25  8:01 PM   Result Value Ref Range    Chloride, Ur 78 mmol/L   Protein / creatinine ratio, urine    Collection Time: 01/31/25  8:01 PM   Result Value Ref Range    Total Protein, Urine 27 mg/dL    Creatinine, Ur 71.0 39.0 - 259.0 mg/dL    Urine Total Protein Creatinine Ratio 0.38 (H) 0.00 - 0.20       Imaging/Diagnostics:  No results found.    Assessment :      Hospital Problems             Last Modified POA    * (Principal) Acute renal failure (ARF) ([facility]) 1/31/2025 Yes    Current smoker 1/31/2025 Yes    Hyperlipidemia 1/31/2025 Yes    Presence of stent in coronary artery 1/31/2025 Yes    COPD (chronic obstructive pulmonary disease) ([facility]) 1/31/2025 Yes    Chronic atrial fibrillation ([facility]) 1/31/2025 Yes    H/O right nephrectomy 1/31/2025 Yes       Plan:     Patient status inpatient in the Progressive Unit/Step down    Acute renal failure secondary to obstructing pelvic mass (has solitary kidney)-   Stat nephrology consult--> ER spoke with nephrology--> stated no need for urgent dialysis given electrolytes are stable.   IV as per nephrology recommendations.    Still has urine output--> needs strict I and Os  Will maintain Byrd    Trend BMP and daily weights  Urology consulted for hydronephrosis      Moderate left hydro nephrosis--awaiting urology's plan

## 2025-02-01 NOTE — ED NOTES
ED to inpatient nurses report      Chief Complaint:  Chief Complaint   Patient presents with    Mass     kidney     Present to ED from:     Pt fell and went to [facility] to have the injury evaluated   Upon evaluation of the fall a mass was found on his kidney  Pt hx of mass to other kidney which is reportedly removed  Pt denied pain/nausea/CP SOB  Pt is alert o x self but seems to have periods of confusion noted        MOA:     LOC: alert and orientated to name and place  Mobility: Fully dependent  Oxygen Baseline: ra    Current needs required: 0   Pending ED orders: none  Present condition: stable    Why did the patient come to the ED? Nephro consult for obstructing renal mass  What is the plan? Nephrostomy tube placement in morning. Could potentially happen tonight  Any procedures or intervention occur? Byrd placed at previous facility  Any safety concerns??    Mental Status:  Level of Consciousness: Alert (0)    Psych Assessment:      Vital signs   Vitals:    01/31/25 1958 01/31/25 2001 01/31/25 2030 01/31/25 2035   BP:  (!) 169/85     Pulse: 78 92 86 84   Resp: 19 18 17 17   Temp:       TempSrc:       SpO2: 96% 99% 96% 97%   Weight:            Vitals:  Patient Vitals for the past 24 hrs:   BP Temp Temp src Pulse Resp SpO2 Weight   01/31/25 2035 -- -- -- 84 17 97 % --   01/31/25 2030 -- -- -- 86 17 96 % --   01/31/25 2001 (!) 169/85 -- -- 92 18 99 % --   01/31/25 1958 -- -- -- 78 19 96 % --   01/31/25 1957 (!) 168/81 -- -- -- -- 97 % --   01/31/25 1901 (!) 157/125 -- -- 90 21 97 % --   01/31/25 1817 -- 98.4 °F (36.9 °C) Oral -- -- -- --   01/31/25 1813 139/82 -- -- 92 18 98 % 113.4 kg (250 lb)      Visit Vitals  BP (!) 169/85   Pulse 84   Temp 98.4 °F (36.9 °C) (Oral)   Resp 17   Wt 113.4 kg (250 lb)   SpO2 97%   BMI 33.91 kg/m²        LDAs:   Peripheral IV 01/31/25 Right Antecubital (Active)       Ambulatory Status:  Presents to emergency department  because of falls (Syncope, seizure, or loss of consciousness): No,

## 2025-02-02 LAB
ANION GAP SERPL CALCULATED.3IONS-SCNC: 12 MMOL/L (ref 9–16)
ANION GAP SERPL CALCULATED.3IONS-SCNC: 13 MMOL/L (ref 9–16)
BUN SERPL-MCNC: 33 MG/DL (ref 8–23)
BUN SERPL-MCNC: 40 MG/DL (ref 8–23)
CALCIUM SERPL-MCNC: 8.3 MG/DL (ref 8.6–10.4)
CALCIUM SERPL-MCNC: 8.6 MG/DL (ref 8.6–10.4)
CHLORIDE SERPL-SCNC: 108 MMOL/L (ref 98–107)
CHLORIDE SERPL-SCNC: 108 MMOL/L (ref 98–107)
CO2 SERPL-SCNC: 22 MMOL/L (ref 20–31)
CO2 SERPL-SCNC: 23 MMOL/L (ref 20–31)
CREAT SERPL-MCNC: 2.4 MG/DL (ref 0.7–1.2)
CREAT SERPL-MCNC: 3.1 MG/DL (ref 0.7–1.2)
GFR, ESTIMATED: 20 ML/MIN/1.73M2
GFR, ESTIMATED: 28 ML/MIN/1.73M2
GLUCOSE BLD-MCNC: 101 MG/DL (ref 75–110)
GLUCOSE BLD-MCNC: 272 MG/DL (ref 75–110)
GLUCOSE SERPL-MCNC: 111 MG/DL (ref 74–99)
GLUCOSE SERPL-MCNC: 98 MG/DL (ref 74–99)
POTASSIUM SERPL-SCNC: 3.3 MMOL/L (ref 3.7–5.3)
POTASSIUM SERPL-SCNC: 3.8 MMOL/L (ref 3.7–5.3)
SODIUM SERPL-SCNC: 143 MMOL/L (ref 136–145)
SODIUM SERPL-SCNC: 143 MMOL/L (ref 136–145)

## 2025-02-02 PROCEDURE — 2060000000 HC ICU INTERMEDIATE R&B

## 2025-02-02 PROCEDURE — 97535 SELF CARE MNGMENT TRAINING: CPT

## 2025-02-02 PROCEDURE — 97166 OT EVAL MOD COMPLEX 45 MIN: CPT

## 2025-02-02 PROCEDURE — 99232 SBSQ HOSP IP/OBS MODERATE 35: CPT | Performed by: STUDENT IN AN ORGANIZED HEALTH CARE EDUCATION/TRAINING PROGRAM

## 2025-02-02 PROCEDURE — 80048 BASIC METABOLIC PNL TOTAL CA: CPT

## 2025-02-02 PROCEDURE — 2500000003 HC RX 250 WO HCPCS: Performed by: NURSE PRACTITIONER

## 2025-02-02 PROCEDURE — 97530 THERAPEUTIC ACTIVITIES: CPT

## 2025-02-02 PROCEDURE — 6370000000 HC RX 637 (ALT 250 FOR IP): Performed by: STUDENT IN AN ORGANIZED HEALTH CARE EDUCATION/TRAINING PROGRAM

## 2025-02-02 PROCEDURE — 6370000000 HC RX 637 (ALT 250 FOR IP): Performed by: NURSE PRACTITIONER

## 2025-02-02 PROCEDURE — 36415 COLL VENOUS BLD VENIPUNCTURE: CPT

## 2025-02-02 PROCEDURE — 99223 1ST HOSP IP/OBS HIGH 75: CPT | Performed by: INTERNAL MEDICINE

## 2025-02-02 PROCEDURE — 99232 SBSQ HOSP IP/OBS MODERATE 35: CPT | Performed by: INTERNAL MEDICINE

## 2025-02-02 PROCEDURE — 82947 ASSAY GLUCOSE BLOOD QUANT: CPT

## 2025-02-02 RX ORDER — NICOTINE 21 MG/24HR
1 PATCH, TRANSDERMAL 24 HOURS TRANSDERMAL DAILY
Status: DISCONTINUED | OUTPATIENT
Start: 2025-02-02 | End: 2025-02-03 | Stop reason: HOSPADM

## 2025-02-02 RX ORDER — DEXTROSE MONOHYDRATE 100 MG/ML
INJECTION, SOLUTION INTRAVENOUS CONTINUOUS PRN
Status: DISCONTINUED | OUTPATIENT
Start: 2025-02-02 | End: 2025-02-03 | Stop reason: HOSPADM

## 2025-02-02 RX ORDER — POTASSIUM CHLORIDE 1500 MG/1
40 TABLET, EXTENDED RELEASE ORAL ONCE
Status: COMPLETED | OUTPATIENT
Start: 2025-02-02 | End: 2025-02-02

## 2025-02-02 RX ORDER — DIPHENHYDRAMINE HCL 25 MG
25 TABLET ORAL EVERY 6 HOURS PRN
Status: DISCONTINUED | OUTPATIENT
Start: 2025-02-02 | End: 2025-02-03 | Stop reason: HOSPADM

## 2025-02-02 RX ORDER — INSULIN LISPRO 100 [IU]/ML
0-8 INJECTION, SOLUTION INTRAVENOUS; SUBCUTANEOUS
Status: DISCONTINUED | OUTPATIENT
Start: 2025-02-02 | End: 2025-02-03 | Stop reason: HOSPADM

## 2025-02-02 RX ORDER — GLUCAGON 1 MG/ML
1 KIT INJECTION PRN
Status: DISCONTINUED | OUTPATIENT
Start: 2025-02-02 | End: 2025-02-03 | Stop reason: HOSPADM

## 2025-02-02 RX ADMIN — DIPHENHYDRAMINE HYDROCHLORIDE 25 MG: 25 TABLET ORAL at 22:17

## 2025-02-02 RX ADMIN — DIPHENHYDRAMINE HYDROCHLORIDE 25 MG: 25 TABLET ORAL at 11:21

## 2025-02-02 RX ADMIN — TAMSULOSIN HYDROCHLORIDE 0.4 MG: 0.4 CAPSULE ORAL at 07:56

## 2025-02-02 RX ADMIN — GABAPENTIN 100 MG: 100 CAPSULE ORAL at 07:55

## 2025-02-02 RX ADMIN — POTASSIUM CHLORIDE 40 MEQ: 1500 TABLET, EXTENDED RELEASE ORAL at 05:53

## 2025-02-02 RX ADMIN — METOPROLOL TARTRATE 12.5 MG: 25 TABLET, FILM COATED ORAL at 07:56

## 2025-02-02 RX ADMIN — AMLODIPINE BESYLATE 10 MG: 10 TABLET ORAL at 07:56

## 2025-02-02 RX ADMIN — OXYCODONE HYDROCHLORIDE AND ACETAMINOPHEN 1 TABLET: 5; 325 TABLET ORAL at 22:17

## 2025-02-02 RX ADMIN — METOPROLOL TARTRATE 12.5 MG: 25 TABLET, FILM COATED ORAL at 20:38

## 2025-02-02 RX ADMIN — GABAPENTIN 100 MG: 100 CAPSULE ORAL at 20:38

## 2025-02-02 RX ADMIN — ATORVASTATIN CALCIUM 80 MG: 80 TABLET, FILM COATED ORAL at 20:38

## 2025-02-02 RX ADMIN — SODIUM CHLORIDE, PRESERVATIVE FREE 10 ML: 5 INJECTION INTRAVENOUS at 20:39

## 2025-02-02 RX ADMIN — SODIUM CHLORIDE, PRESERVATIVE FREE 10 ML: 5 INJECTION INTRAVENOUS at 07:56

## 2025-02-02 RX ADMIN — GABAPENTIN 100 MG: 100 CAPSULE ORAL at 16:57

## 2025-02-02 ASSESSMENT — PAIN SCALES - GENERAL
PAINLEVEL_OUTOF10: 6
PAINLEVEL_OUTOF10: 0

## 2025-02-02 NOTE — PLAN OF CARE
Problem: Safety - Adult  Goal: Free from fall injury  2/2/2025 0045 by Jose Herrera RN  Outcome: Progressing     Problem: ABCDS Injury Assessment  Goal: Absence of physical injury  2/2/2025 0045 by Jose Herrera RN  Outcome: Progressing     Problem: Skin/Tissue Integrity - Adult  Goal: Skin integrity remains intact  2/2/2025 0045 by Jose Herrera RN  Outcome: Progressing     Problem: Genitourinary - Adult  Goal: Absence of urinary retention  2/2/2025 0045 by Jose Herrera RN  Outcome: Progressing

## 2025-02-02 NOTE — CARE COORDINATION
Case Management Assessment  Initial Evaluation    Date/Time of Evaluation: 2/2/2025 5:20 PM  Assessment Completed by: Katie Grey    If patient is discharged prior to next notation, then this note serves as note for discharge by case management.    Patient Name: Moris Mcneil                   YOB: 1950  Diagnosis: Pelvic mass [R19.00]  Acute renal failure (ARF) (HCC) [N17.9]  Acute renal failure, unspecified acute renal failure type (HCC) [N17.9]                   Date / Time: 1/31/2025  6:09 PM    Patient Admission Status: Inpatient   Readmission Risk (Low < 19, Mod (19-27), High > 27): Readmission Risk Score: 16.1    Current PCP: Digna Waddell MD  PCP verified by CM? (P) Yes ([facility])    Chart Reviewed: Yes      History Provided by: Patient, Significant Other  Patient Orientation: Alert and Oriented    Patient Cognition: Alert    Hospitalization in the last 30 days (Readmission):  No    If yes, Readmission Assessment in CM Navigator will be completed.    Advance Directives:      Code Status: Full Code   Patient's Primary Decision Maker is: (P) Legal Next of Kin      Discharge Planning:    Patient lives with: (P) Spouse/Significant Other Type of Home: (P) House  Primary Care Giver: Spouse  Patient Support Systems include: (P) Spouse/Significant Other, Children   Current Financial resources: (P) Medicare, Medicaid  Current community resources: (P) None  Current services prior to admission: (P) Durable Medical Equipment            Current DME: (P) Cane, Walker            Type of Home Care services:  (P) None    ADLS  Prior functional level: (P) Assistance with the following:  Current functional level: (P) Assistance with the following:    PT AM-PAC:   /24  OT AM-PAC: 17 /24    Family can provide assistance at DC: (P) Yes  Would you like Case Management to discuss the discharge plan with any other family members/significant others, and if so, who? (P) Yes (Wife,

## 2025-02-03 VITALS
DIASTOLIC BLOOD PRESSURE: 83 MMHG | HEIGHT: 73 IN | OXYGEN SATURATION: 94 % | HEART RATE: 76 BPM | RESPIRATION RATE: 19 BRPM | WEIGHT: 211.64 LBS | TEMPERATURE: 97.9 F | BODY MASS INDEX: 28.05 KG/M2 | SYSTOLIC BLOOD PRESSURE: 137 MMHG

## 2025-02-03 PROBLEM — E87.20 METABOLIC ACIDOSIS: Status: ACTIVE | Noted: 2025-02-03

## 2025-02-03 PROBLEM — Z90.5 H/O RIGHT NEPHRECTOMY: Status: ACTIVE | Noted: 2025-02-03

## 2025-02-03 LAB
ANION GAP SERPL CALCULATED.3IONS-SCNC: 12 MMOL/L (ref 9–16)
BUN SERPL-MCNC: 27 MG/DL (ref 8–23)
BUN SERPL-MCNC: 28 MG/DL (ref 8–23)
BUN SERPL-MCNC: 33 MG/DL (ref 8–23)
CA-I BLD-SCNC: 1.06 MMOL/L (ref 1.13–1.33)
CALCIUM SERPL-MCNC: 8.3 MG/DL (ref 8.6–10.4)
CALCIUM SERPL-MCNC: 8.4 MG/DL (ref 8.6–10.4)
CALCIUM SERPL-MCNC: 8.8 MG/DL (ref 8.6–10.4)
CHLORIDE SERPL-SCNC: 106 MMOL/L (ref 98–107)
CHLORIDE SERPL-SCNC: 108 MMOL/L (ref 98–107)
CHLORIDE SERPL-SCNC: 111 MMOL/L (ref 98–107)
CO2 SERPL-SCNC: 20 MMOL/L (ref 20–31)
CO2 SERPL-SCNC: 22 MMOL/L (ref 20–31)
CO2 SERPL-SCNC: 22 MMOL/L (ref 20–31)
CREAT SERPL-MCNC: 1.8 MG/DL (ref 0.7–1.2)
CREAT SERPL-MCNC: 2 MG/DL (ref 0.7–1.2)
CREAT SERPL-MCNC: 2.3 MG/DL (ref 0.7–1.2)
EKG ATRIAL RATE: 110 BPM
EKG Q-T INTERVAL: 286 MS
EKG QRS DURATION: 84 MS
EKG QTC CALCULATION (BAZETT): 385 MS
EKG R AXIS: 32 DEGREES
EKG T AXIS: -74 DEGREES
EKG VENTRICULAR RATE: 109 BPM
EST. AVERAGE GLUCOSE BLD GHB EST-MCNC: 105 MG/DL
GFR, ESTIMATED: 29 ML/MIN/1.73M2
GFR, ESTIMATED: 35 ML/MIN/1.73M2
GFR, ESTIMATED: 39 ML/MIN/1.73M2
GLUCOSE BLD-MCNC: 104 MG/DL (ref 75–110)
GLUCOSE BLD-MCNC: 104 MG/DL (ref 75–110)
GLUCOSE BLD-MCNC: 112 MG/DL (ref 75–110)
GLUCOSE BLD-MCNC: 126 MG/DL (ref 75–110)
GLUCOSE SERPL-MCNC: 102 MG/DL (ref 74–99)
GLUCOSE SERPL-MCNC: 109 MG/DL (ref 74–99)
GLUCOSE SERPL-MCNC: 115 MG/DL (ref 74–99)
HBA1C MFR BLD: 5.3 % (ref 4–6)
POTASSIUM SERPL-SCNC: 3.7 MMOL/L (ref 3.7–5.3)
SODIUM SERPL-SCNC: 140 MMOL/L (ref 136–145)
SODIUM SERPL-SCNC: 140 MMOL/L (ref 136–145)
SODIUM SERPL-SCNC: 145 MMOL/L (ref 136–145)

## 2025-02-03 PROCEDURE — 97162 PT EVAL MOD COMPLEX 30 MIN: CPT

## 2025-02-03 PROCEDURE — 6370000000 HC RX 637 (ALT 250 FOR IP): Performed by: NURSE PRACTITIONER

## 2025-02-03 PROCEDURE — 83036 HEMOGLOBIN GLYCOSYLATED A1C: CPT

## 2025-02-03 PROCEDURE — 6360000002 HC RX W HCPCS

## 2025-02-03 PROCEDURE — 99239 HOSP IP/OBS DSCHRG MGMT >30: CPT | Performed by: STUDENT IN AN ORGANIZED HEALTH CARE EDUCATION/TRAINING PROGRAM

## 2025-02-03 PROCEDURE — 99232 SBSQ HOSP IP/OBS MODERATE 35: CPT | Performed by: INTERNAL MEDICINE

## 2025-02-03 PROCEDURE — 2500000003 HC RX 250 WO HCPCS: Performed by: NURSE PRACTITIONER

## 2025-02-03 PROCEDURE — 80048 BASIC METABOLIC PNL TOTAL CA: CPT

## 2025-02-03 PROCEDURE — 97530 THERAPEUTIC ACTIVITIES: CPT

## 2025-02-03 PROCEDURE — 36415 COLL VENOUS BLD VENIPUNCTURE: CPT

## 2025-02-03 PROCEDURE — 82947 ASSAY GLUCOSE BLOOD QUANT: CPT

## 2025-02-03 PROCEDURE — 82330 ASSAY OF CALCIUM: CPT

## 2025-02-03 PROCEDURE — 97116 GAIT TRAINING THERAPY: CPT

## 2025-02-03 RX ORDER — CALCIUM GLUCONATE 20 MG/ML
1000 INJECTION, SOLUTION INTRAVENOUS ONCE
Status: COMPLETED | OUTPATIENT
Start: 2025-02-03 | End: 2025-02-03

## 2025-02-03 RX ADMIN — CALCIUM GLUCONATE 1000 MG: 20 INJECTION, SOLUTION INTRAVENOUS at 12:25

## 2025-02-03 RX ADMIN — SODIUM CHLORIDE, PRESERVATIVE FREE 10 ML: 5 INJECTION INTRAVENOUS at 08:26

## 2025-02-03 RX ADMIN — TAMSULOSIN HYDROCHLORIDE 0.4 MG: 0.4 CAPSULE ORAL at 08:25

## 2025-02-03 RX ADMIN — AMLODIPINE BESYLATE 10 MG: 10 TABLET ORAL at 08:24

## 2025-02-03 RX ADMIN — GABAPENTIN 100 MG: 100 CAPSULE ORAL at 08:25

## 2025-02-03 RX ADMIN — METOPROLOL TARTRATE 12.5 MG: 25 TABLET, FILM COATED ORAL at 08:23

## 2025-02-03 RX ADMIN — OXYCODONE HYDROCHLORIDE AND ACETAMINOPHEN 1 TABLET: 5; 325 TABLET ORAL at 08:25

## 2025-02-03 RX ADMIN — POLYETHYLENE GLYCOL 3350 17 G: 17 POWDER, FOR SOLUTION ORAL at 08:27

## 2025-02-03 RX ADMIN — ONDANSETRON 4 MG: 4 TABLET, ORALLY DISINTEGRATING ORAL at 11:06

## 2025-02-03 ASSESSMENT — PAIN SCALES - WONG BAKER: WONGBAKER_NUMERICALRESPONSE: NO HURT

## 2025-02-03 NOTE — PROGRESS NOTES
Robbin Iyer Santacroce, Khan, Mostafa, Andrés Patel Jr  Urology Progress Note     Subjective: L hydronephrosis in L solitary kidney s/p L PNT placement with IR 2/1/25  Nothing acute overnight  AFVSS  Generally feeling ok  Does not seem to be totally aware of what's going on  Cr continues to downtrend 3.1 (5.4)    L neph tube 1.575L since placement yesterday scott red colored urine  Byrd 650cc yellow urine, somewhat cloudy    Patient Vitals for the past 24 hrs:   BP Temp Temp src Pulse Resp SpO2   02/02/25 0752 (!) 144/89 97.9 °F (36.6 °C) Oral 90 18 93 %   02/02/25 0430 137/81 97.4 °F (36.3 °C) Oral 79 22 92 %   02/02/25 0000 (!) 113/90 97.4 °F (36.3 °C) Oral 78 22 97 %   02/01/25 1940 -- -- -- 98 18 97 %   02/01/25 1535 103/76 98.1 °F (36.7 °C) Oral (!) 126 14 96 %   02/01/25 1458 (!) 109/97 -- -- (!) 123 28 96 %   02/01/25 1154 (!) 147/91 97.9 °F (36.6 °C) Oral 86 20 100 %   02/01/25 1053 -- -- -- -- 20 --   02/01/25 1036 -- -- -- -- 20 --   02/01/25 1028 (!) 154/86 -- -- (!) 104 22 99 %   02/01/25 1024 (!) 174/93 -- -- 98 17 99 %   02/01/25 1019 (!) 166/93 -- -- 93 17 98 %   02/01/25 1007 (!) 149/94 -- -- 91 19 98 %   02/01/25 1004 (!) 168/85 -- -- 83 21 98 %   02/01/25 1000 (!) 152/101 -- -- 92 19 98 %   02/01/25 0839 (!) 144/94 98.4 °F (36.9 °C) Oral (!) 106 17 92 %       Intake/Output Summary (Last 24 hours) at 2/2/2025 0802  Last data filed at 2/2/2025 0622  Gross per 24 hour   Intake 751.18 ml   Output 2225 ml   Net -1473.82 ml       Recent Labs     01/31/25  1836 02/01/25  0555   WBC 9.3 8.3   HGB 10.9* 10.4*   HCT 34.0* 33.7*   MCV 70.1* 71.5*   PLT See Reflexed IPF Result See Reflexed IPF Result     Recent Labs     01/31/25  1836 02/01/25  0555 02/02/25  0328   * 147* 143   K 4.0 3.7 3.3*   * 112* 108*   CO2 19* 20 22   BUN 62* 52* 40*   CREATININE 7.2* 5.4* 3.1*       Recent Labs     01/31/25 2001   COLORU Yellow   PHUR 5.5   WBCUA 5 TO 10   RBCUA 5 TO 10   BACTERIA None 
Nephrology Progress Note    Subjective:     Patient seen and examined at bedside, vital signs stable, afebrile, no acute events overnight.  Lengthy discussion was had at bedside with patient and wife regarding outpatient follow-up, patient's wife states that she \"does not understand why the patient needs to follow-up with a kidney doctor when he sees Dr. Iyer\".  We spoke at length about the difference between urology and nephrology, however ultimately I reached out to the patient's daughter to inform her of the plan moving forward from nephrology standpoint as she is their POA and takes them to all of their appointments.    Urology has cleared patient for discharge, he is to go with Byrd catheter and neph tube, no input yet from heme-onc as to whether or not biopsy will be done on an inpatient or outpatient basis.  Awaiting physical therapy evaluation as well for discharge planning.    Urine output documented at 2710 mL over 24 hours.    Labs reviewed.  Recent Labs     25  1319 25  0200 25  0645    140 145   K 3.8 3.7 3.7   * 108* 111*   CO2 23 20 22   BUN 33* 33* 28*   CREATININE 2.4* 2.3* 2.0*   GLUCOSE 98 102* 109*   CALCIUM 8.6 8.3* 8.4*     Hg 10.4    In Summary: Consult date was 25 by Dr. Castillo  Patient underwent percutaneous nephrostomy as well as Byrd.  His urine output is good both from PCN and from Byrd.  In the last 24 hours his urine output was 2.7 L and in first shift he put out close to 900 mL.  His creatinine is slowly improving.  Objective:  CURRENT TEMPERATURE:  Temp:  (pt refused 0400 vitals)  MAXIMUM TEMPERATURE OVER 24HRS:  Temp (24hrs), Av.5 °F (36.9 °C), Min:98.4 °F (36.9 °C), Max:98.8 °F (37.1 °C)    CURRENT RESPIRATORY RATE:  Respirations: 16  CURRENT PULSE:  Pulse: 98  CURRENT BLOOD PRESSURE:  BP: (!) 130/90  24HR BLOOD PRESSURE RANGE:  Systolic (24hrs), Av , Min:105 , Max:132   ; Diastolic (24hrs), Av, Min:58, Max:90    24HR INTAKE/OUTPUT:  
Nephrology Progress Note    Subjective:     Patient seen and examined, family at bedside, no acute overnight issues, patient still notices back pain more on left side.     Na 143 K3.3 chloride 108 CO2 22 BUN 40 Cr down to 3.1, glucose 111, calcium 8.3  U/o total 2225 (btwn nephrostomy tube and valles) past 24 hours  Remains on 0.45% NS at 100 cc/hr.     Hem/onc did see him in consult. They are recommending PET scan and to obtain tissue diagnosis and obtain OSH imaging.     Per Urology plan to maintain L neph tube and Valles catheter for maximal urinary decompression    No growth on urine culture.     In Summary: Consult date was 25 by Dr. Castillo  Patient underwent percutaneous nephrostomy as well as Valles.  His urine output is good both from PCN and from Valles.  In the last 24 hours his urine output was 2.7 L and in first shift he put out close to 900 mL.  His creatinine is slowly improving.  Objective:  CURRENT TEMPERATURE:  Temp: 97.9 °F (36.6 °C)  MAXIMUM TEMPERATURE OVER 24HRS:  Temp (24hrs), Av.7 °F (36.5 °C), Min:97.4 °F (36.3 °C), Max:98.1 °F (36.7 °C)    CURRENT RESPIRATORY RATE:  Respirations: 18  CURRENT PULSE:  Pulse: 90  CURRENT BLOOD PRESSURE:  BP: (!) 144/89  24HR BLOOD PRESSURE RANGE:  Systolic (24hrs), Av , Min:103 , Max:147   ; Diastolic (24hrs), Av, Min:76, Max:97    24HR INTAKE/OUTPUT:    Intake/Output Summary (Last 24 hours) at 2025 1043  Last data filed at 2025 0800  Gross per 24 hour   Intake 751.18 ml   Output 2885 ml   Net -2133.82 ml     Patient Vitals for the past 96 hrs (Last 3 readings):   Weight   25 0045 96 kg (211 lb 10.3 oz)   25 1813 113.4 kg (250 lb)     Physical Exam:  General appearance: In bed.  He was complaining of back pain  Skin: warm and dry, no rash or erythema  Eyes: conjunctivae normal and sclera anicteric  ENT: :no thrush no pharyngeal congestion    Neck: no carotid bruit, no JVD   Pulmonary: no wheezing or rhonchi. No rales 
Physical Therapy  Facility/Department: [address] ONC/MED SURG   Physical Therapy Initial Evaluation    Patient Name: Moris Mcneil        MRN: [id number]    : 1950    Date of Service: 2/3/2025    Chief Complaint   Patient presents with    Mass     kidney     Past Medical History:  has a past medical history of Arthritis, At risk for falling, CAD (coronary artery disease), Chronic pain, Constipation, COPD (chronic obstructive pulmonary disease) (HCC), COVID, COVID-19 vaccine series completed, Diverticulosis, Edentulous, GERD (gastroesophageal reflux disease), Hematuria, Hyperlipidemia, Hypertension, Kidney stones, Mobility impaired, New onset a-fib ([facility]), NSTEMI (non-ST elevated myocardial infarction) ([facility]), Pneumonia, Poor historian, Pulmonary nodule, Renal cancer ([facility]), Right renal mass, SOB (shortness of breath), Swelling, Tremor, Under care of team, Under care of team, Under care of team, Under care of team, Wears glasses, Weight loss, and Wellness examination.  Past Surgical History:  has a past surgical history that includes back surgery (); Coronary angioplasty with stent (2017); Coronary angioplasty with stent (2017); Coronary angioplasty (12/10/2019); Coronary angioplasty with stent (2020); ablation of dysrhythmic focus (04/10/2018); Tonsillectomy; Cataract extraction w/ intraocular lens implant (Bilateral, ); Cystoscopy (Bilateral, 2023); Bladder surgery (Bilateral, 2023); Ureter surgery (Left, 2023); Kidney removal (Right, 2024); other surgical history (Right, 2024); Kidney surgery (Right, 2024); and IR GUIDED NEPHROSTOMY CATH PLACEMENT LEFT (2025).    Discharge Recommendations  Discharge Recommendations: Patient would benefit from continued therapy after discharge  PT Equipment Recommendations  Equipment Needed: No  Other: He has a rollator. He needs physical assistance not equipment for safety.    Assessment  Body Structures, Functions, 
[facility]  Office: [phone]  Joby Kirkland DO, Eliud Smith DO, Rambo Jasso DO, Reynaldo Oquendo DO, Edel Ontiveros MD, Annette Ho MD, Jarred Camacho MD, Ambar Webster MD,  Champ Doss MD, Zayra Brown MD, Adrian Pichardo MD,  Raman Mejia DO, Swapnil San MD, Roland Enriquez MD, Babak Kirkland DO, Eleanor Bear MD,  Ethan Li DO, Rocio Easley MD, Misa Amezquita MD, Azul Dickerson MD, Miguel Angel Gant MD,  Damian Munoz MD, Mike Arroyo MD, Vinod Martínez MD, Rick Benjamin MD, Law Adler MD, Homero Yeung MD, Jak Clinton DO, Miguelito Ardon MD, Raman Boyle MD, Mohsin Reza, MD, Shirley Waterhouse, CNP,  Mray Ann Austin, CNP, Jak Hassan, CNP,  Maddi Winter, DNP, Farhana Bradford, CNP, Alyssa Sanderson, CNP, Isabel Prieto, CNP, Feli Milton, CNP, Nuria Helms, PA-C, Yanira Morgan, PA-C, Veronika Stokes, CNP, Karol Treviño, CNP,  Tamie Modi, CNP, Ale Jimenez, CNP, Jeanna Ferrara, CNP,  Hawa Damon, CNS, Laina Pablo, CNP, Sheri Lewis, CNP,   Shameka Zee, CNP         [facility]   IN-PATIENT SERVICE   [facility]    Progress Note    2/1/2025    2:05 PM    Name:   Moris Mcneil  MRN:     [id number]     Acct:      [id number]   Room:   0447/0447-01   Day:  1  Admit Date:  1/31/2025  6:09 PM    PCP:   Digna Waddell MD  Code Status:  Full Code    Subjective:     C/C:   Chief Complaint   Patient presents with    Mass     kidney     Interval History Status: improved.     2/1: IR GUIDED NEPHROSTOMY CATH PLACEMENT LEFT    Pt and daughter at bedside agree for SNF on discharge      Brief History:     Moris Mcneil is a 74 y.o.  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 
nephU; s/p L PNT placement 2/1/2025    Plan:  Maintain L neph tube and Byrd catheter for maximal urinary decompression until creatinine nadirs  Trend Cr - downtrending  Monitor output  Urine culture 1/31 - no growth  No further intervention from urological standpoint at this time  L solitary kidney likely to become progressively obstructed as long as pelvic mass persists  Patient will thus need to undergo L nephrostomy tube exchanges q3 months going forward for chronic management of his hydronephrosis    Avelino Woodward MD PGY-2  7:02 AM 2/3/2025     
Diverticulosis, Edentulous, GERD (gastroesophageal reflux disease), Hematuria, Hyperlipidemia, Hypertension, Kidney stones, Mobility impaired, New onset a-fib (HCC), NSTEMI (non-ST elevated myocardial infarction) (HCC), Pneumonia, Poor historian, Pulmonary nodule, Renal cancer (HCC), Right renal mass, SOB (shortness of breath), Swelling, Tremor, Under care of team, Under care of team, Under care of team, Under care of team, Wears glasses, Weight loss, and Wellness examination.    Social History:   reports that he has been smoking cigarettes. He started smoking about 61 years ago. He has a 123.1 pack-year smoking history. He has never used smokeless tobacco. He reports that he does not currently use alcohol. He reports that he does not use drugs.     Family History:   Family History   Problem Relation Age of Onset    Brain Cancer Brother     Brain Cancer Brother     Brain Cancer Brother        Vitals:  BP (!) 144/89   Pulse 90   Temp 97.9 °F (36.6 °C) (Oral)   Resp 18   Ht 1.854 m (6' 1\")   Wt 96 kg (211 lb 10.3 oz)   SpO2 93%   BMI 27.92 kg/m²   Temp (24hrs), Av.7 °F (36.5 °C), Min:97.4 °F (36.3 °C), Max:98.1 °F (36.7 °C)    Recent Labs     25  1247 25  1659 25  0852   POCGLU 133* 136* 272*       I/O (24Hr):    Intake/Output Summary (Last 24 hours) at 2025 1231  Last data filed at 2025 0800  Gross per 24 hour   Intake 751.18 ml   Output 2285 ml   Net -1533.82 ml       Labs:  Hematology:  Recent Labs     25  1836 25  0555   WBC 9.3 8.3   RBC 4.85 4.71   HGB 10.9* 10.4*   HCT 34.0* 33.7*   MCV 70.1* 71.5*   MCH 22.5* 22.1*   MCHC 32.1 30.9   RDW 23.2* 23.1*   PLT See Reflexed IPF Result See Reflexed IPF Result   INR 1.7  --      Chemistry:  Recent Labs     25  1836 25  0555 25  0328   * 147* 143   K 4.0 3.7 3.3*   * 112* 108*   CO2 19* 20 22   GLUCOSE 89 98 111*   BUN 62* 52* 40*   CREATININE 7.2* 5.4* 3.1*   MG  --  2.2  --    ANIONGAP 17* 
reach;Gait belt;Left in bed;Nurse notified (Pt left sitting EOB with tray table in front of pt with spouse present. Pt ed on proper use of call light, spouse ed to not leve room unless pt is supine in bed, both pt and spouse verbally agreed)  Restraints  Restraints Initially in Place: No    AM-PAC  AM-PAC Daily Activity - Inpatient   How much help is needed for putting on and taking off regular lower body clothing?: A Lot  How much help is needed for bathing (which includes washing, rinsing, drying)?: A Lot  How much help is needed for toileting (which includes using toilet, bedpan, or urinal)?: A Lot  How much help is needed for putting on and taking off regular upper body clothing?: A Little  How much help is needed for taking care of personal grooming?: None  How much help for eating meals?: None  AM-Merged with [facility] Inpatient Daily Activity Raw Score: 17  AM-PAC Inpatient ADL T-Scale Score : 37.26  ADL Inpatient CMS 0-100% Score: 50.11  ADL Inpatient CMS G-Code Modifier : CK    Restrictions/Precautions  Restrictions/Precautions  Restrictions/Precautions: Fall Risk  Activity Level: Up with Assist;Other (Comment) (Activity as tolerated with assist only)  Required Braces or Orthoses?: No  Position Activity Restriction  Other Position/Activity Restrictions: L nephrostomy tube, valles catheter, CT shows large left pelvic mass moderate hydronephrosis of left kidney, s/p left PCN in IR 2/1/2025  O2 Device: None (Room air)    Subjective  General  Patient assessed for rehabilitation services?: Yes  Family / Caregiver Present: Yes (Spouse entered room towards middle of session)  Pain  Post-Pain: 2  Pain Location:  (back discomfort, more itching)  Pain Interventions: Repositioning;Other (Comment) (distraction utilized, back scratched)       Home Setup/Prior Level of Function  Social/Functional History  Lives With: Spouse  Type of Home: House  Home Layout: Two level;Performs ADL's on one level;Able to Live on Main level with

## 2025-02-03 NOTE — DISCHARGE SUMMARY
[facility]  Office: [phone]  Joby Kirkland DO, Eliud Smith DO, Rambo Jasso DO, Reynaldo Oquendo DO, Edel Ontiveros MD, Annette Ho MD, Jarred Camacho MD, Ambar Webster MD,  Champ Doss MD, Zayra Brown MD, Adrian Pichardo MD,  Raman Mejia DO, Swapnil San MD, Roland Enriquez MD, Babak Kirkland DO, Eleanor Bear MD,  Ethan Li DO, Rocio Easley MD, Misa Amezquita MD, Azul Dickerson MD, Miguel Angel Gant MD,  Damian Munoz MD, Mike Arroyo MD, Vinod Martínez MD, Rick Benjamin MD, Law Adler MD, Homero Yeung MD, Jak Clinton DO, Miguelito Ardon MD, Raman Boyle MD, Mohsin Reza, MD, Shirley Waterhouse, CNP,  Mary Ann Austin CNP, Jak Hassan, CNP,  Maddi Winter, DNP, Farhana Bradford, CNP, Alyssa Sanderson, CNP, Isabel Prieto, CNP, Feli Milton, CNP, Nuria Helms, PA-C, Yanira Morgan PA-C, Veronika Stokes, CNP, Karol Treviño, CNP,  Tamie Modi, CNP, Ale Jimenez, CNP, Jeanna Ferrara, CNP,  Hawa Damon, CNS, Laina Pablo, CNP, Sheri Lewis, CNP,   Shameka Zee, CNP         [facility]   IN-PATIENT SERVICE   [facility]    Discharge Summary     Patient ID: Moris Mcneil  :  1950   MRN: [id number]     ACCOUNT:  [id number]   Patient's PCP: Digna Waddell MD  Admit Date: 2025   Discharge Date: 2/3/2025     Length of Stay: 3  Code Status:  Full Code  Admitting Physician: No admitting provider for patient encounter.  Discharge Physician: Hanny Wylie MD     Active Discharge Diagnoses:     Hospital Problem Lists:  Principal Problem:    Acute renal failure (ARF) (HCC)  Active Problems:    Current smoker    Hyperlipidemia    Presence of stent in coronary artery    COPD (chronic obstructive pulmonary disease) (HCC)    Chronic atrial fibrillation (HCC)    H/O right nephrectomy    Pelvic mass    Obstructed, uropathy  Resolved Problems:    * No resolved hospital problems. *      Admission Condition:

## 2025-02-03 NOTE — DISCHARGE INSTR - COC
Continuity of Care Form    Patient Name: Moris Mcneil   :  1950  MRN:  [id number]    Admit date:  2025  Discharge date:  2/3/2025    Code Status Order: Full Code   Advance Directives:   Advance Care Flowsheet Documentation             Admitting Physician:  No admitting provider for patient encounter.  PCP: Digna Waddell MD    Discharging Nurse: Shelley Rodrigues   Discharging Hospital Unit/Room#: 0447/0447-01  Discharging Unit Phone Number: [phone]    Emergency Contact:   Extended Emergency Contact Information  Primary Emergency Contact: Laura Mcneil  Home Phone: [phone]  Mobile Phone: [phone]  Relation: Child  Secondary Emergency Contact: Terri Mcneil  Home Phone: [phone]  Mobile Phone: [phone]  Relation: Spouse    Past Surgical History:  Past Surgical History:   Procedure Laterality Date    ABLATION OF DYSRHYTHMIC FOCUS  04/10/2018    for AV nicolas reentry tachycardia    BACK SURGERY      discectomy L2-3-4    BLADDER SURGERY Bilateral 2023    CYSTOSCOPY STENT REMOVAL performed by Pierre Iyer MD at [facility] OR    CATARACT EXTRACTION W/ INTRAOCULAR LENS IMPLANT Bilateral     CORONARY ANGIOPLASTY  12/10/2019    of RCA, unable to place stent    CORONARY ANGIOPLASTY WITH STENT PLACEMENT  2017    JAY x 2 to RCA    CORONARY ANGIOPLASTY WITH STENT PLACEMENT  2017    JAY to CX    CORONARY ANGIOPLASTY WITH STENT PLACEMENT  2020    laser arthrectomy with stent  to RCA : at [facility]    CYSTOSCOPY Bilateral 2023    CYSTOSCOPY, BILATERAL RETROGRADE PYELOGRAMS, BILATERAL URETEROSCOPY, RIGHT URETERAL BIOPSY, BILATERAL STENT PLACEMENT performed by Pierre Iyer MD at [facility] OR    IR GUIDED NEPHROSTOMY CATH PLACEMENT LEFT  2025    IR GUIDED NEPHROSTOMY CATH PLACEMENT LEFT 2025 Yuri Bacon MD [facility] SPECIAL PROCEDURES    KIDNEY REMOVAL Right 2024    I ROBOTIC LAPAROSCOPIC NEPHROURETERECTOMY (Right)    KIDNEY SURGERY Right 2024    XI

## 2025-02-04 NOTE — CONSULTS
Mary Iyer, Jorge, Stephanie, Emily, Andrés, Arpit, & Robbin  Urology Consultation      Patient:  Moris Mcneil  MRN: [id number]  YOB: 1950    CHIEF COMPLAINT:  Left hydronephrosis    HISTORY OF PRESENT ILLNESS:   The patient is a 74 y.o. male who presents after a fall. Patient initially presented to outside hospital secondary to a fall. He had a scan which showed hydronephrosis of the left kidney all the way to a suspected pelvic mass. As a result, patient was transferred to [place] for further management and plan.     Patient has significant urological history, he underwent a right nephroureterectomy for cancer approximately 1 year ago. He was lost to follow-up. Pathology from surgery does show high grade cancer with invasion into the muscularis.    He has new hydro and acute renal failure in the left kidney.     He denies associated pain.    Resting comfortably, able to make urine.     Patient's old records, notes and chart reviewed and summarized above.    Past Medical History:    Past Medical History:   Diagnosis Date    Arthritis     back , can not stand up straight    At risk for falling     pt states legs \" unstable \" , \" just give out \"    CAD (coronary artery disease)     4 stents    Chronic pain     back    Constipation     COPD (chronic obstructive pulmonary disease) ([facility])     suspected- states due to see a pulmonologist in Feb 2024    COVID 11/2023    hospitalized- states before thanksgiving    COVID-19 vaccine series completed     per wife, asked to bring card day of surgery to get documented    Diverticulosis 10/2023    on CT scan    Edentulous     GERD (gastroesophageal reflux disease)     diet controlled    Hematuria 2023    intermittently    Hyperlipidemia     Hypertension     Kidney stones 10/2023    on CT scan    Mobility impaired     uses cane or walker    New onset a-fib ([facility]) 10/20/2023    NSTEMI (non-ST elevated myocardial infarction) ([facility]) 07/25/2017    Pneumonia 
  Today's Date: 2/2/2025  Patient Name: Moris Mcneil  Date of admission: 1/31/2025  6:09 PM  Patient's age: 74 y.o., 1950  Admission Dx: Pelvic mass [R19.00]  Acute renal failure (ARF) (HCC) [N17.9]  Acute renal failure, unspecified acute renal failure type (HCC) [N17.9]    Reason for Consult: Large pelvic mass  Requesting Physician: No admitting provider for patient encounter.    CHIEF COMPLAINT: Left obstructive uropathy    History Obtained From:  patient    HISTORY OF PRESENT ILLNESS:      The patient is a 74 y.o.  male who is admitted to the hospital for acute kidney injury and obstructive uropathy patient had history of muscle invasive urothelial cell carcinoma status post right nephroureterectomy with no adjuvant treatment done he presented initially to outside hospital for a fall and scan did show left hydronephrosis related to pelvic mass, he was evaluated by  and had nephrostomy tube  I do not have the scan which was done at outside facility to review however it was done without IV contrast      12/6/23 0000    Surgical Pathology Report Path Number: [id number]    -- Diagnosis --  A. RIGHT URETER, BIOPSY:  High grade papillary urothelial carcinoma.  See comment.    -- Diagnosis Comment --  Slides are reviewed by additional pathologists who concur with these  diagnostic findings (SF, DS).      Katey Turner M.D.  **Electronically Signed Out**        kmg2/12/8/2023               Past Medical History:   has a past medical history of Arthritis, At risk for falling, CAD (coronary artery disease), Chronic pain, Constipation, COPD (chronic obstructive pulmonary disease) ([facility]), COVID, COVID-19 vaccine series completed, Diverticulosis, Edentulous, GERD (gastroesophageal reflux disease), Hematuria, Hyperlipidemia, Hypertension, Kidney stones, Mobility impaired, New onset a-fib (HCC), NSTEMI (non-ST elevated myocardial infarction) ([facility]), Pneumonia, Poor historian, Pulmonary nodule, Renal cancer 
Nephrology Consult Note    Reason for Consult:  ARUN  Requesting Physician:  Dr. Wylie    Chief Complaint:  fall at home proceeded by weakness    History Obtained From:  patient, EMR, RN, family at bedside    History of Present Illness:              This is a 74 y.o. 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 He was lost to follow-up. Pathology from surgery does show high grade cancer with invasion into the muscularis.   Additional hx  of DJD, COPD from smoking. Presented to OSH s/p weakness at home for a couple days, slid out of bed onto floor wife then summoned EMS    He was found on non contrast CT to have large left pelvic mass moderate hydro nephrosis left kidney associated with ARUN. Patient reports he was still making urine at home, denies any n/v/d.   Labs from [facility] na+=146 fk=141 CO2=19 bun/creat=62/7.2 WBC =9.3 hbg/hct=10.9/34.0 mcv=70.1  UA +1protein +trace leukocyte est 5 to 10 WBC moderate hbg     This am Na 147 K 3.7 Chloride 112 CO2 20 BUN 52 Cr 5.4 AG 15, mag 2.2, glucose 98, calcium 9.0, albumin 3.2, wbc 8.3 hgb 10.4, plat 213.     He was started on a bicarb gtt this am and sent to IR this am.  Urology did see him as well for new hydronephrosis secondary to obstructing mass in the left pelvis.  Hx of right nephro ureterectomy with invasion. And was lost to follow up.   At home did not have an n/v/d or abdominal pain.     He just returned to floor s/p IR guided nephrostomy cath placement (left).  U/o is good.     Urology continues to follow and Oncology is also consulted for possible metastasis secondary to previous ureteral carcinoma.     Medication review shows use flomax, levsin, norvasc, eliquis, aspirin, lipitor, lopressor, nitro prn, Percocet and gabapentin.   Past Medical History:        Diagnosis Date    Arthritis     back , can not stand up straight    At risk for falling     pt states legs \" 
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 I had a detailed discussion with the patient.I explained the significance of these abnormalities and possible etiology and management options   74-year-old man with a history of upper urothelial cancer muscle invasive status post right nephroureterectomy back in December 2023 with no adjuvant or neoadjuvant treatment presented with left hydronephrosis and obstructive uropathy related to pelvic mass concern about recurrence> I do not have the imaging from outside facility to review nevertheless it was done without IV contrast> will review the imaging and obtain a PET/CT and obtain tissue diagnosis  monitor creatinine  Give the patient option to follow-up with me in [place] as close to [place]      Discussed with patient and Nurse.      Thank you for asking us to see this patient.                                    Maricarmen Lau MD                          [facility]                            This note is created with the assistance of a speech recognition program.  While intending to generate a document that actually reflects the content of the visit, the document can still have some errors including those of syntax and sound a like substitutions which may escape proof reading.  It such instances, actual meaning can be extrapolated by contextual diversion.     Hematologist/Medical Oncologist

## 2025-02-10 LAB
EKG ATRIAL RATE: 110 BPM
EKG Q-T INTERVAL: 286 MS
EKG QRS DURATION: 84 MS
EKG QTC CALCULATION (BAZETT): 385 MS
EKG R AXIS: 32 DEGREES
EKG T AXIS: -74 DEGREES
EKG VENTRICULAR RATE: 109 BPM

## 2025-04-10 ENCOUNTER — HOSPITAL ENCOUNTER (OUTPATIENT)
Age: 75
Setting detail: SPECIMEN
Discharge: HOME | End: 2025-04-10

## 2025-04-25 ENCOUNTER — TELEPHONE (OUTPATIENT)
Dept: INTERVENTIONAL RADIOLOGY/VASCULAR | Age: 75
End: 2025-04-25

## 2025-04-25 NOTE — TELEPHONE ENCOUNTER
Attempted to schedule pt for neph tube exchange. LM advising pt to contact [phone] to schedule.     
Calm

## 2025-05-16 ENCOUNTER — HOSPITAL ENCOUNTER (OUTPATIENT)
Dept: INTERVENTIONAL RADIOLOGY/VASCULAR | Age: 75
Discharge: HOME | End: 2025-05-18
Payer: MEDICARE

## 2025-05-16 VITALS — OXYGEN SATURATION: 95 % | SYSTOLIC BLOOD PRESSURE: 144 MMHG | HEART RATE: 100 BPM | DIASTOLIC BLOOD PRESSURE: 93 MMHG

## 2025-05-16 DIAGNOSIS — N13.30 HYDRONEPHROSIS OF LEFT KIDNEY: ICD-10-CM

## 2025-05-16 PROCEDURE — C1729 CATH, DRAINAGE: HCPCS

## 2025-05-16 PROCEDURE — 6360000004 HC RX CONTRAST MEDICATION: Performed by: UROLOGY

## 2025-05-16 PROCEDURE — 50435 EXCHANGE NEPHROSTOMY CATH: CPT

## 2025-05-16 RX ADMIN — IOHEXOL 12 ML: 240 INJECTION, SOLUTION INTRATHECAL; INTRAVASCULAR; INTRAVENOUS; ORAL at 12:24

## 2025-05-16 NOTE — BRIEF OP NOTE
Brief Postoperative Note    Moris Mcneil  YOB: 1950  [id number]    Pre-operative Diagnosis: Routine nephrostomy tube exchange    Post-operative Diagnosis: Same    Procedure: left Nephrostomy Tube Exchange     Anesthesia: 1% Lidocaine     Surgeons/Assistants: Varun Marcus PA-C    Estimated Blood Loss: Minimal    Complications: none    Specimens: were not obtained    10 Fr Nephrostomy exchanged for new 10 Fr Nephrostomy tube.  New nephrostomy tube was sutured to the skin.  Dressing and gravity drainage bag applied.  Vital signs were reviewed and were stable after the procedure.     Electronically signed by LATRICIA Li on 5/16/2025 at 12:09 PM

## 2025-05-21 ENCOUNTER — HOSPITAL ENCOUNTER (OUTPATIENT)
Age: 75
Setting detail: SPECIMEN
Discharge: HOME | End: 2025-05-21

## 2025-06-16 LAB — SURGICAL PATHOLOGY REPORT: NORMAL

## 2025-07-01 ENCOUNTER — HOSPITAL ENCOUNTER (OUTPATIENT)
Dept: INTERVENTIONAL RADIOLOGY/VASCULAR | Age: 75
Discharge: HOME | End: 2025-07-03
Payer: MEDICARE

## 2025-07-01 VITALS — RESPIRATION RATE: 18 BRPM | OXYGEN SATURATION: 91 % | HEART RATE: 88 BPM

## 2025-07-01 DIAGNOSIS — N13.39 OTHER HYDRONEPHROSIS: ICD-10-CM

## 2025-07-01 PROCEDURE — C1769 GUIDE WIRE: HCPCS

## 2025-07-01 PROCEDURE — 50435 EXCHANGE NEPHROSTOMY CATH: CPT

## 2025-07-01 PROCEDURE — 6360000004 HC RX CONTRAST MEDICATION: Performed by: UROLOGY

## 2025-07-01 RX ADMIN — IOHEXOL 16 ML: 240 INJECTION, SOLUTION INTRATHECAL; INTRAVASCULAR; INTRAVENOUS; ORAL at 14:05

## 2025-07-01 NOTE — BRIEF OP NOTE
Brief Postoperative Note    Moris Mcneil  YOB: 1950  [id number]    Pre-operative Diagnosis: Routine nephrostomy tube exchange    Post-operative Diagnosis: Same    Procedure: left Nephrostomy Tube Exchange     Anesthesia: 1% Lidocaine     Surgeons/Assistants: MD Jordi    Estimated Blood Loss: Minimal    Complications: none    Specimens: were not obtained    10 Fr Nephrostomy exchanged for new 10 Fr Nephrostomy tube.  New nephrostomy tube was sutured to the skin.  Dressing and gravity drainage bag applied.  Vital signs were reviewed and were stable after the procedure.     Electronically signed by LATRICIA Li on 7/1/2025 at 2:14 PM

## 2025-07-01 NOTE — PROGRESS NOTES
Pt arrives to room for left neph XC  Very weak  JM and AC RT to bedside  Max assist to stretcher from WC  Dr Bacon to bedside  Site prepped and draped  10F neph replaced without difficulty  Next appointment given

## (undated) DEVICE — TRI-LUMEN FILTERED TUBE SET WITH ACTIVATED CHARCOAL FILTER: Brand: AIRSEAL

## (undated) DEVICE — STRAP ARMBRD W1.5XL32IN FOAM STR YET SFT W/ HK AND LOOP

## (undated) DEVICE — DRAPE,REIN 53X77,STERILE: Brand: MEDLINE

## (undated) DEVICE — GUIDEWIRE URO L145CM DIA0.035IN TIP L7CM S STL PTFE BENT

## (undated) DEVICE — GUIDEWIRE URO L150CM DIA .035IN STIFF NIT HYDRPHL STR TIP

## (undated) DEVICE — DUAL LUMEN URETERAL CATHETER

## (undated) DEVICE — BAG SPEC LAP 9X7.5 IN 12 MM 1500 CC MEM WIRE CANN NYL

## (undated) DEVICE — RELOAD STPL L45MM H1-2.6MM MESENTERY THN TISS WHT GRIPPING

## (undated) DEVICE — URETEROSCOPIC BIOPSY FORCEPS: Brand: PIRANHA

## (undated) DEVICE — SINGLE ACTION PUMPING SYSTEM

## (undated) DEVICE — PACK PROCEDURE SURG CYSTO SVMMC LF

## (undated) DEVICE — MASTISOL ADHESIVE AMPULE

## (undated) DEVICE — APPLICATOR VISTASEAL DUAL

## (undated) DEVICE — ELECTRO LUBE IS A SINGLE PATIENT USE DEVICE THAT IS INTENDED TO BE USED ON ELECTROSURGICAL ELECTRODES TO REDUCE STICKING.: Brand: KEY SURGICAL ELECTRO LUBE

## (undated) DEVICE — CATHETER URET 5FR L70CM OPN END SGL LUMN INJ HUB FLEXIMA

## (undated) DEVICE — GARMENT,MEDLINE,DVT,INT,CALF,MED, GEN2: Brand: MEDLINE

## (undated) DEVICE — COVER,LIGHT HANDLE,FLX,2/PK: Brand: MEDLINE INDUSTRIES, INC.

## (undated) DEVICE — STRAP,POSITIONING,KNEE/BODY,FOAM,4X60": Brand: MEDLINE

## (undated) DEVICE — SURGICEL ENDOSCP APPL

## (undated) DEVICE — SINGLE-USE DIGITAL FLEXIBLE URETEROSCOPE: Brand: LITHOVUE

## (undated) DEVICE — SEALANT TISS 10 CC FOR HUM FIBRIN VISTASEAL

## (undated) DEVICE — SUTURE PDS II SZ 0 L60IN ABSRB VLT L65MM TP-1 1/2 CIR Z991G

## (undated) DEVICE — CONTAINER,SPECIMEN,4OZ,OR STRL: Brand: MEDLINE

## (undated) DEVICE — CONE TIP URETERAL CATHETER WITH OPEN-END: Brand: CONE TIP

## (undated) DEVICE — TROCAR: Brand: KII FIOS FIRST ENTRY

## (undated) DEVICE — INTENDED FOR TISSUE SEPARATION, AND OTHER PROCEDURES THAT REQUIRE A SHARP SURGICAL BLADE TO PUNCTURE OR CUT.: Brand: BARD-PARKER ® CARBON RIB-BACK BLADES

## (undated) DEVICE — SOLUTION ANTIFOG VIS SYS CLEARIFY LAPSCP

## (undated) DEVICE — LOOP VES W25MM THK1MM MAXI RED SIL FLD REPELLENT 100 PER

## (undated) DEVICE — SYRINGE, LUER LOCK, 30ML: Brand: MEDLINE

## (undated) DEVICE — LIQUIBAND RAPID ADHESIVE 36/CS 0.8ML: Brand: MEDLINE

## (undated) DEVICE — 3M™ IOBAN™ 2 ANTIMICROBIAL INCISE DRAPE 6650EZ: Brand: IOBAN™ 2

## (undated) DEVICE — SUTURE STRATAFIX SPRL PDS + SZ 3 0 L6IN ABSRB VLT RB 1 L17MM SXPP1B422

## (undated) DEVICE — DRAINBAG,ANTI-REFLUX TOWER,L/F,2000ML,LL: Brand: MEDLINE

## (undated) DEVICE — ADAPTER URETSCP Y TYP ROT M LUER CONN TUOHY BORST VLV BLU

## (undated) DEVICE — STAPLER ENDOSCP 45MM L34CM STD NAT ARTC LIN CUT ECHELON FLX

## (undated) DEVICE — INSUFFLATION NEEDLE TO ESTABLISH PNEUMOPERITONEUM.: Brand: INSUFFLATION NEEDLE

## (undated) DEVICE — CATHETER URETH 16FR BLLN 5CC SIL ALLY W/ SIL HYDRGEL 2 W F

## (undated) DEVICE — SUTURE PERMAHAND SZ 0 L18IN NONABSORBABLE BLK SILK BRAID A186H

## (undated) DEVICE — PROTECTOR ULN NRV PUR FOAM HK LOOP STRP ANATOMICALLY

## (undated) DEVICE — BLADE CLIPPER GEN PURP NS

## (undated) DEVICE — STRAP,CATHETER,ELASTIC,HOOK&LOOP: Brand: MEDLINE

## (undated) DEVICE — TOWEL,OR,DSP,ST,NATURAL,DLX,4/PK,20PK/CS: Brand: MEDLINE

## (undated) DEVICE — ARM DRAPE

## (undated) DEVICE — TIP COVER ACCESSORY

## (undated) DEVICE — BLADE,CARBON-STEEL,15,STRL,DISPOSABLE: Brand: MEDLINE

## (undated) DEVICE — APPLICATOR MEDICATED 26 CC SOLUTION HI LT ORNG CHLORAPREP

## (undated) DEVICE — RELOAD STPL L60MM H1-2.6MM MESENTERY THN TISS WHT 6 ROW

## (undated) DEVICE — STRIP,CLOSURE,WOUND,MEDI-STRIP,1/2X4: Brand: MEDLINE

## (undated) DEVICE — PACK PROCEDURE SURG ROBOTIC KID SVMMC

## (undated) DEVICE — 1LYRTR 16FR10ML100%SIL UMS SNP: Brand: MEDLINE INDUSTRIES, INC.

## (undated) DEVICE — GLOVE ORTHO 7 1/2   MSG9475

## (undated) DEVICE — STANDARD HYPODERMIC NEEDLE,ALUMINUM HUB: Brand: MONOJECT

## (undated) DEVICE — BLADELESS OBTURATOR: Brand: WECK VISTA

## (undated) DEVICE — SUTURE MCRYL SZ 4-0 L18IN ABSRB UD L16MM PC-3 3/8 CIR PRIM Y845G

## (undated) DEVICE — SEAL

## (undated) DEVICE — NEEDLE,BLUNT,18GX1": Brand: MEDLINE

## (undated) DEVICE — SYRINGE, LUER LOCK, 10ML: Brand: MEDLINE